# Patient Record
Sex: FEMALE | Race: BLACK OR AFRICAN AMERICAN | URBAN - METROPOLITAN AREA
[De-identification: names, ages, dates, MRNs, and addresses within clinical notes are randomized per-mention and may not be internally consistent; named-entity substitution may affect disease eponyms.]

---

## 2018-07-31 ENCOUNTER — HISTORICAL (OUTPATIENT)
Dept: ADMINISTRATIVE | Facility: HOSPITAL | Age: 38
End: 2018-07-31

## 2018-07-31 LAB
APPEARANCE, UA: ABNORMAL
BACTERIA #/AREA URNS AUTO: ABNORMAL /[HPF]
BILIRUB SERPL-MCNC: NORMAL MG/DL
BILIRUB UR QL STRIP: NEGATIVE
BLOOD URINE, POC: NORMAL
CLARITY, POC UA: NORMAL
COLOR UR: YELLOW
COLOR, POC UA: NORMAL
GLUCOSE (UA): NORMAL
GLUCOSE UR QL STRIP: NEGATIVE
HGB UR QL STRIP: 0.06 MG/DL
HIV 1+2 AB+HIV1 P24 AG SERPL QL IA: NONREACTIVE
HYALINE CASTS #/AREA URNS LPF: ABNORMAL /[LPF]
KETONES UR QL STRIP: 10 MG/DL
KETONES UR QL STRIP: NORMAL
LEUKOCYTE EST, POC UA: NORMAL
LEUKOCYTE ESTERASE UR QL STRIP: 75 LEU/UL
MUCOUS THREADS URNS QL MICRO: ABNORMAL
NITRITE UR QL STRIP: NEGATIVE
NITRITE, POC UA: NEGATIVE
PH UR STRIP: 6 [PH] (ref 4.5–8)
PH, POC UA: 6
PROT UR QL STRIP: 30 MG/DL
PROTEIN, POC: NORMAL
RBC #/AREA URNS AUTO: ABNORMAL /[HPF]
SP GR UR STRIP: 1.03 (ref 1–1.03)
SPECIFIC GRAVITY, POC UA: 1.02
SQUAMOUS #/AREA URNS LPF: >100 /[LPF]
UROBILINOGEN UR STRIP-ACNC: 2 MG/DL
UROBILINOGEN, POC UA: NORMAL
WBC #/AREA URNS AUTO: ABNORMAL /HPF

## 2018-08-02 LAB — FINAL CULTURE: NORMAL

## 2022-04-28 VITALS
BODY MASS INDEX: 21.79 KG/M2 | OXYGEN SATURATION: 100 % | WEIGHT: 155.63 LBS | HEIGHT: 71 IN | DIASTOLIC BLOOD PRESSURE: 96 MMHG | SYSTOLIC BLOOD PRESSURE: 142 MMHG

## 2022-05-03 NOTE — HISTORICAL [facility] CERNER
This is a historical note converted from [employer]. Formatting and pictures may have been removed.  Please reference [employer]ner for original formatting and attached multimedia. Chief Complaint  c/o nodule to forehead, mone ears, burning on urination, bump to vaginal area, brown vaginal discharge x 2 days  History of Present Illness  39 yo BF with recent diagnosis of Hep C (diagnosed through [facility]); awaiting appt with [facility] ID clinic; HIV was non reactive in June. Patient noticed swelling to scalp 2-3 days ago and awoke with swelling to forehead and swelling in front of both ears without redness; no ear pain. Patient noticed painful lesions to genital area since yesterday. Last sexual encounter was 2 days ago with long time partner who is also Hepatitis C +. Burning with urination x 2 days. Brown vaginal discharge with no reports of odor. No fever or chills; no N/V. No pelvic or back pain.  Review of Systems  GENERAL: Negative except as stated?in HPI  CV: Negative except as stated in HPI  RESP: Negative except as stated?in HPI  GI: Negative?except as stated in?HPI  : Negative?except as stated in?HPI  SKIN: Negative?except as stated in?HPI  Neuro: Negative?except as stated in?HPI  MS: Negative?except as stated in?HPI  Psych: Negative?except as stated in?HPI  Physical Exam  Vitals & Measurements  T:?36.6? ?C (Oral)? BP:?142/96? SpO2:?100%?  HT:?181?cm? HT:?181?cm? WT:?70.6?kg? WT:?70.6?kg? BMI:?21.55?  Vital Signs reviewed  General: Well nourished. No acute distress.  Eyes: Extraocular movements intact. Normal conjunctiva.  HENT: No cervical lymphadenopathy on palpation. No intraoral lesions or erythema. Normal posterior pharynx. Nares patent and without lesions. ?Preauricular?lymph node enlargement. Mild swelling to forehead with?possible insect bite noted to scalp; no induration or drainage noted.  CV: Regular rate and rhythm. No swelling.  Respiratory:?Lungs clear to auscultation bilaterally. Respirations  even and non-labored.  Abdominal: Abdomen soft and non-tender. Active bowel sounds x 4 quadrants. No splenohepatomegaly.  : 2 vesicular?lesions noted to periurethral area tenderness and erythema; on speculum exam, no vaginal masses or lesions noted; moderate amount of thin white?vaginal?discharge with odor. ?Cervix appeared within normal limits. ?On bimanual exam no cervical motion tenderness. No inguinal lymphadenopathy.  Musculoskeletal: No deformity or alteration in gait.  Neuro: Awake, alert and oriented. No focal deficits noted.  Psych: Normal mood and affect; cooperative.  ?  ?  Assessment/Plan  1.?BV (bacterial vaginosis)  ?Flagyl 500 mg by mouth twice a day ?7 days. ?Diflucan 150 mg by mouth ?1 dose at completion of Flagyl.  ?  2.?Acute UTI  Urinalysis with positive protein, leukoesterase, white blood cells, and red blood cells; nitrite negative. ?Urine C&S pending. ?Macrobid 100 mg by mouth twice a day ?7 days. ?Plenty of fluids. ?Follow-up for fever, chills,?nausea vomiting, or back pain.  ?  3.?Hepatitis C  Keeps scheduled appointment with infectious disease clinic for treatment of hepatitis C.  ?  4.?Female genital lesion  HSV culture collected tonight. ?HIV?collected; urine for GC and Chlamydia. ?Clinic will contact patient with these results.??Safe sex practices discussed.  Ordered:  HSV by DFA w/Reflex to HSV Culture-[facility], Stat collect, Micro Specimen, Collected, 07/31/18 20:18:00 CDT, Stop date 07/31/18 20:18:00 CDT, Nurse collect, Female genital lesion, 07/31/18 20:18:00 CDT  ?  Burning with urination  Ordered:  Urine Culture [id number], Stat collect, 07/31/18 19:43:00 CDT, Urine, Nurse collect, Stop date 07/31/18 19:44:00 CDT, Burning with urination  ?  Sexually transmitted disease (STD)  Ordered:  Chlamydia trach and N. gonorrhea PCR, Routine collect, Urine, Collected, 07/31/18 20:12:00 CDT, Stop date 07/31/18 20:12:00 CDT, Nurse collect, Sexually transmitted disease (STD)  HIV 1 and 2, Stat collect,  07/31/18 20:12:00 CDT, Blood, Collected, Stop date 07/31/18 20:12:00 CDT, Nurse collect, Sexually transmitted disease (STD), 07/31/18 20:12:00 CDT  ?  Vaginal discharge  ?   Problem List/Past Medical History  Ongoing  Chronic neck pain  Hepatitis C  Historical  No qualifying data  Procedure/Surgical History  skull fracture   Medications  ALLERGY 10 MG TABLET, 10 mg= 1 tab(s), Oral, Daily  Diflucan 150 mg oral tablet, See Instructions  Flagyl 500 mg oral tablet, 500 mg= 1 tab(s), Oral, q12hr  Macrobid 100 mg oral capsule, 100 mg= 1 cap(s), Oral, BID  XULANE PATCH  Allergies  No Known Allergies  Social History  Alcohol - Denies Alcohol Use, 12/22/2012  Current, 1-2 times per month, 07/31/2018  Substance Abuse - Denies Substance Abuse, 07/28/2015  Never, 07/31/2018  Tobacco - High Risk, 07/28/2015  Current every day smoker Use:. Cigarettes Type:. 7 per day., 07/31/2018  Current every day smoker, Cigarettes, 08/01/2015  Immunizations  Vaccine Date Status   tetanus/diphtheria/pertussis, acel(Tdap) 08/01/2015 Given   Health Maintenance  Health Maintenance  ???Pending?(in the next year)  ??? ??Due?  ??? ? ? ?Alcohol Misuse Screening due??07/31/18??and every 1??year(s)  ???Satisfied?(in the past 1 year)  ??? ??Satisfied?  ??? ? ? ?Blood Pressure Screening on??07/31/18.??Satisfied by Whitney Walsh LPN  ??? ? ? ?Body Mass Index Check on??07/31/18.??Satisfied by Whitney Walsh LPN  ??? ? ? ?Depression Screening on??07/31/18.??Satisfied by Whitney Walsh LPN  ??? ? ? ?Obesity Screening on??07/31/18.??Satisfied by Whitney Walsh LPN  ??? ? ? ?Smoking Cessation on??07/31/18.??Satisfied by Whitney Walsh LPN  ?  ?      Patient presented to [facility] today 08/01/18 requesting to start herpes treatment pending official culture results. Patient states she is moving to [place] today and is concerned because the lesions are becoming bigger and more painful. She intends to keep upcoming appt with ID clinic despite moving to [place]. Rx  for Valcyclovir 1 gram PO BID x 7 days (escribed to patients pharmay). I informed her GC and chlamydia testing was negative. [facility] will contact patient with results of HIV and Herpes culture when they become available.  Albina WATTS, EZIOP-C

## 2024-02-09 ENCOUNTER — OFFICE VISIT (OUTPATIENT)
Age: 44
End: 2024-02-09

## 2024-02-09 VITALS
SYSTOLIC BLOOD PRESSURE: 117 MMHG | HEIGHT: 69 IN | HEART RATE: 86 BPM | RESPIRATION RATE: 16 BRPM | WEIGHT: 293 LBS | BODY MASS INDEX: 43.4 KG/M2 | TEMPERATURE: 96.8 F | OXYGEN SATURATION: 100 % | DIASTOLIC BLOOD PRESSURE: 80 MMHG

## 2024-02-09 DIAGNOSIS — F17.200 SMOKER: ICD-10-CM

## 2024-02-09 DIAGNOSIS — I82.4Y9 DEEP VEIN THROMBOSIS (DVT) OF PROXIMAL LOWER EXTREMITY, UNSPECIFIED CHRONICITY, UNSPECIFIED LATERALITY (HCC): ICD-10-CM

## 2024-02-09 DIAGNOSIS — Z79.01 ANTICOAGULATED: ICD-10-CM

## 2024-02-09 DIAGNOSIS — R79.89 LOW TSH LEVEL: ICD-10-CM

## 2024-02-09 DIAGNOSIS — E66.01 MORBID OBESITY (HCC): ICD-10-CM

## 2024-02-09 DIAGNOSIS — K30 FUNCTIONAL DYSPEPSIA: ICD-10-CM

## 2024-02-09 DIAGNOSIS — E66.01 MORBID OBESITY WITH BODY MASS INDEX (BMI) OF 40.0 TO 49.9 (HCC): Primary | ICD-10-CM

## 2024-02-09 PROBLEM — I82.409 DVT (DEEP VENOUS THROMBOSIS) (HCC): Status: ACTIVE | Noted: 2024-02-09

## 2024-02-09 NOTE — PROGRESS NOTES
Bariatric Surgery Consultation    Subjective:     The patient is a 43 y.o. obese female with a Body mass index is 43.5 kg/m²..  The patient is currently her heaviest weight for the past several years.  she has been overweight since early adulthood.   she has been considering surgery since last year.  she desires surgery at this time because of multiple health concerns and their lifestyle issues which are hindered by their weight. she has been referred by his family physician for evaluation and treatment of their obesity via surgical intervention. Norris Yañez has tried multiple diets in her lifetime most recently tried behavior modification and unsupervised diets    Bariatric comorbidities present are   Patient Active Problem List   Diagnosis    Morbid obesity (HCC)    Morbid obesity with body mass index (BMI) of 40.0 to 49.9 (HCC)    Anticoagulated    DVT (deep venous thrombosis) (HCC)    Functional dyspepsia    Low TSH level    Smoker     The patient is considering laparoscopic sleeve gastrectomy  for surgical weight loss due to their ineffective progress with medical forms of weight loss and the urging of their physician who cares for their primary medical issues. The patient  now presents  for consideration for weight loss surgery understanding the benefits of this over a medical approach of weight loss as was discussed in our presentation on weight loss surgery. They have discussed their plans both with their family and primary care physician who is in support of their pursuit of such. The patient has had no health issues as of late and denies and gastrointestinal disturbances other than what is outlined below in their review of symptoms. All of their prior evaluations available by both their PCP's and specialists physicians have been reviewed today either in the Care Everywhere portal or scanned under the media tab.    I have spent a large portion of my initial consultation today reviewing the patients

## 2024-03-14 ENCOUNTER — HOSPITAL ENCOUNTER (OUTPATIENT)
Facility: HOSPITAL | Age: 44
Discharge: HOME | End: 2024-03-17

## 2024-03-14 VITALS — WEIGHT: 287.3 LBS | BODY MASS INDEX: 42.55 KG/M2 | HEIGHT: 69 IN

## 2024-03-14 NOTE — PROGRESS NOTES
vigorous physical activity.    We talked about recommended types of physical activity, including walking, swimming, cycling, or chair exercises.  I also talked with patient about doing some strength training, which helps the metabolism, as well as strengthen muscles and bones.  Patient's plan to incorporate more activity includes: \"more exercising, more discipline\".      Behavior Modification     Comments:  During today's class, we discussed mindful eating as a behavior change tool to improve eating behaviors, promote weight loss and long-term weight maintenance, encourage a healthy relationship with food, and prevent chronic disease.  Patient was directed to the handouts on mindful eating that were provided in class.  We discussed the acronym HALT, which is a reminder to promote eating only when physically hungry and not as a way of dealing with difficult emotions, fatigue or other life stressors.      Goals:   Work to increase to 3-4 small meals per day, with 1-2 planned snacks as needed.  Recommend following the plate method for meal planning - focusing on lean protein, non-starchy vegetables, and measured amounts of complex carbohydrates.   - Goal of  g protein and <100 g carbohydrates per day.               - Select at least 2 DIFFERENT protein supplements that can be used for protein supplementation to meet goals pre- and post-operatively.   - Practice Carbohydrate Counting and label reading.   - Follow 3 g rule for fat and sugar.  2. Slow down meals  - Chew each bite 25-35 times.  - Aim for 20-30 min/meal.  3. Increase non-caloric fluids to 64 oz per day.  Eliminate caffeine, added sugar, carbonation, and straws.               - Continue to work to decrease sugar sweetened beverages - goal of calorie-free beverages only.              - Must eliminate caffeine prior to surgery and avoid for ~6-8 weeks.   - Practice 30:30 rule,  food and fluid intake.  4. Start activity regimen, work to increase

## 2024-04-04 ENCOUNTER — HOSPITAL ENCOUNTER (OUTPATIENT)
Facility: HOSPITAL | Age: 44
Discharge: HOME | End: 2024-04-07

## 2024-04-04 VITALS — HEIGHT: 69 IN | BODY MASS INDEX: 42.05 KG/M2 | WEIGHT: 283.9 LBS

## 2024-04-04 DIAGNOSIS — E66.01 MORBID OBESITY WITH BODY MASS INDEX (BMI) OF 40.0 TO 49.9 (HCC): ICD-10-CM

## 2024-04-04 DIAGNOSIS — E66.01 MORBID OBESITY (HCC): Primary | ICD-10-CM

## 2024-04-04 DIAGNOSIS — Z01.812 PRE-OPERATIVE LABORATORY EXAMINATION: ICD-10-CM

## 2024-04-04 DIAGNOSIS — E66.01 MORBID OBESITY (HCC): ICD-10-CM

## 2024-04-04 NOTE — PROGRESS NOTES
Medical Weight Loss Multi-Disciplinary Program    Patient's Name: Norris Yañez Age: 43 y.o.   YOB: 1980 Sex: female     Session #2. Pt attended in-person class.  Weight obtained in office.    Date: 4/4/2024    Vitals:    04/04/24 1530   Weight: 128.8 kg (283 lb 14.4 oz)   Height: 1.753 m (5' 9\")      Body mass index is 41.92 kg/m².              Pounds Lost since last month: 3.4 lbs  Pounds Gained since last month: 0 lbs       Starting Weight: 294.6 lbs Previous Month’s Weight: 287.3 lbs   Overall Pounds Lost: 3.6 lbs Overall Pounds Gained: 0 lbs             Class Guidelines    Guidelines are reviewed with patient at the start of every class.    1. Patient understands that weight loss trial classes must be consecutive.  Patient understands if they miss a class, it is their responsibility to contact me to reschedule class.  I will reach out to patient after their first no show.  2.  Patient understands the expectations that weight maintenance/weight loss is expected during the classes.  Failure to demonstrate changes may result in extension of weight loss trial, followed by returning to see the surgeon.  Patient understands that they CANNOT gain any weight during the weight loss trial.  Gaining weight will result in extension of weight loss trial.  3. Patient is also instructed to complete their labwork, psychological evaluation visit, and any other tests that the surgeon has used while they are working on their weight loss trial.  4.  Patient was instructed to bring their packet of nutrition education materials to every class and appointment.        Eating Habits and Behaviors    Today in class we reviewed the Key Diet Principles.  Patient was encouraged to consume 3 meals each day, and the timing the meals was reviewed.  Meal time behaviors that will help pt to be successful with their weight loss efforts were additionally reviewed.      We discussed the importance of drinking adequate amounts

## 2024-04-23 ENCOUNTER — TELEPHONE (OUTPATIENT)
Age: 44
End: 2024-04-23

## 2024-04-23 NOTE — TELEPHONE ENCOUNTER
Patient was contacted to discuss some important pre-op information and education in preparation for surgery.

## 2024-05-02 ENCOUNTER — TELEPHONE (OUTPATIENT)
Age: 44
End: 2024-05-02

## 2024-05-02 NOTE — TELEPHONE ENCOUNTER
Patient was contacted to discuss some important pre-op information and education in preparation for surgery.       Diabetes:  Have you been testing your blood sugar levels at home, and what have they been the past few days?  Not diabetic     If they are not significantly lower than 200 the entire month prior to surgery, you need to let us know and contact your family physician for an appointment.    Hypertension:  Are you monitoring your blood pressure at home, and what has it been? No hypertension     If your blood pressure has not been well-controlled with a systolic pressure lower than  160 and a diastolic pressure lower than 100, you need to contact your family physician for an appointment.    Steroids (oral and/or injectable):  Have you recently taken any oral steroids such as Prednisone or a Medrol dose pack for a respiratory infection or COVID? No     Have you had any steroid injections such as a cortisone injection in your back, knee, foot, or any other part of your body? No     You can not have any oral steroids and/or steroid injections 45 days prior to surgery.      NSAIDS:  Have you recently taken any NSAIDS such as Mobic, Aleve, Naprosyn, Motrin, BC or Goody's powder?  No and Pt is aware     NSAIDS must be stopped 14 days prior to surgery.  If you are currently taking aspirin or Plavix that a physician has prescribed, continue to take it as prescribed, and a provider in our office will advise you during your pre-op appointment.    Birth Control:  Are you currently taking any form of birth control?  If so, are you taking the Depo Provera injection or oral birth control pills?  No     You need to stop taking it two weeks prior to surgery and can start back two weeks after surgery.  You will need to use another form of protection during this time, such as condoms to avoid pregancy.  If you have an IUD or implant as a form of birth control, it is okay, and you may continue.    Medication:  Have you had any

## 2024-05-03 ENCOUNTER — HOSPITAL ENCOUNTER (OUTPATIENT)
Facility: HOSPITAL | Age: 44
Discharge: HOME | End: 2024-05-06

## 2024-05-03 ENCOUNTER — HOSPITAL ENCOUNTER (OUTPATIENT)
Facility: HOSPITAL | Age: 44
End: 2024-05-03
Payer: MEDICAID

## 2024-05-03 DIAGNOSIS — E66.01 MORBID OBESITY WITH BODY MASS INDEX (BMI) OF 40.0 TO 49.9 (HCC): ICD-10-CM

## 2024-05-03 DIAGNOSIS — Z01.812 PRE-OPERATIVE LABORATORY EXAMINATION: ICD-10-CM

## 2024-05-03 DIAGNOSIS — E66.01 MORBID OBESITY (HCC): ICD-10-CM

## 2024-05-03 LAB
EKG ATRIAL RATE: 57 BPM
EKG DIAGNOSIS: NORMAL
EKG P AXIS: 50 DEGREES
EKG P-R INTERVAL: 154 MS
EKG Q-T INTERVAL: 414 MS
EKG QRS DURATION: 88 MS
EKG QTC CALCULATION (BAZETT): 402 MS
EKG R AXIS: 51 DEGREES
EKG T AXIS: 19 DEGREES
EKG VENTRICULAR RATE: 57 BPM
LABCORP SPECIMEN COLLECTION: NORMAL

## 2024-05-03 PROCEDURE — 93005 ELECTROCARDIOGRAM TRACING: CPT

## 2024-05-03 PROCEDURE — 99001 SPECIMEN HANDLING PT-LAB: CPT

## 2024-05-04 LAB
ALBUMIN SERPL-MCNC: 3.7 G/DL (ref 3.9–4.9)
ALBUMIN/GLOB SERPL: 1.2 {RATIO} (ref 1.2–2.2)
ALP SERPL-CCNC: 92 IU/L (ref 44–121)
ALT SERPL-CCNC: 11 IU/L (ref 0–32)
AST SERPL-CCNC: 13 IU/L (ref 0–40)
BASOPHILS # BLD AUTO: 0 X10E3/UL (ref 0–0.2)
BASOPHILS NFR BLD AUTO: 0 %
BILIRUB SERPL-MCNC: 0.5 MG/DL (ref 0–1.2)
BUN SERPL-MCNC: 7 MG/DL (ref 6–24)
BUN/CREAT SERPL: 10 (ref 9–23)
CALCIUM SERPL-MCNC: 8.7 MG/DL (ref 8.7–10.2)
CHLORIDE SERPL-SCNC: 106 MMOL/L (ref 96–106)
CO2 SERPL-SCNC: 22 MMOL/L (ref 20–29)
CREAT SERPL-MCNC: 0.7 MG/DL (ref 0.57–1)
EGFRCR SERPLBLD CKD-EPI 2021: 111 ML/MIN/1.73
EOSINOPHIL # BLD AUTO: 0 X10E3/UL (ref 0–0.4)
EOSINOPHIL NFR BLD AUTO: 1 %
ERYTHROCYTE [DISTWIDTH] IN BLOOD BY AUTOMATED COUNT: 13 % (ref 11.7–15.4)
GLOBULIN SER CALC-MCNC: 3.2 G/DL (ref 1.5–4.5)
GLUCOSE SERPL-MCNC: 88 MG/DL (ref 70–99)
HCG INTACT+B SERPL-ACNC: <1 MIU/ML
HCT VFR BLD AUTO: 36.8 % (ref 34–46.6)
HGB BLD-MCNC: 12 G/DL (ref 11.1–15.9)
IMM GRANULOCYTES # BLD AUTO: 0 X10E3/UL (ref 0–0.1)
IMM GRANULOCYTES NFR BLD AUTO: 0 %
LYMPHOCYTES # BLD AUTO: 1.8 X10E3/UL (ref 0.7–3.1)
LYMPHOCYTES NFR BLD AUTO: 38 %
MCH RBC QN AUTO: 31.3 PG (ref 26.6–33)
MCHC RBC AUTO-ENTMCNC: 32.6 G/DL (ref 31.5–35.7)
MCV RBC AUTO: 96 FL (ref 79–97)
MONOCYTES # BLD AUTO: 0.4 X10E3/UL (ref 0.1–0.9)
MONOCYTES NFR BLD AUTO: 8 %
NEUTROPHILS # BLD AUTO: 2.5 X10E3/UL (ref 1.4–7)
NEUTROPHILS NFR BLD AUTO: 53 %
PLATELET # BLD AUTO: 254 X10E3/UL (ref 150–450)
POTASSIUM SERPL-SCNC: 3.7 MMOL/L (ref 3.5–5.2)
PROT SERPL-MCNC: 6.9 G/DL (ref 6–8.5)
RBC # BLD AUTO: 3.84 X10E6/UL (ref 3.77–5.28)
SODIUM SERPL-SCNC: 142 MMOL/L (ref 134–144)
SPECIMEN STATUS REPORT: NORMAL
WBC # BLD AUTO: 4.7 X10E3/UL (ref 3.4–10.8)

## 2024-05-08 ENCOUNTER — ANESTHESIA EVENT (OUTPATIENT)
Facility: HOSPITAL | Age: 44
End: 2024-05-08
Payer: MEDICAID

## 2024-05-13 ENCOUNTER — TRANSCRIBE ORDERS (OUTPATIENT)
Facility: HOSPITAL | Age: 44
End: 2024-05-13

## 2024-05-13 ENCOUNTER — OFFICE VISIT (OUTPATIENT)
Age: 44
End: 2024-05-13
Payer: MEDICAID

## 2024-05-13 ENCOUNTER — CLINICAL DOCUMENTATION (OUTPATIENT)
Age: 44
End: 2024-05-13

## 2024-05-13 ENCOUNTER — HOSPITAL ENCOUNTER (OUTPATIENT)
Facility: HOSPITAL | Age: 44
Discharge: HOME | End: 2024-05-16
Payer: MEDICAID

## 2024-05-13 ENCOUNTER — HOSPITAL ENCOUNTER (OUTPATIENT)
Facility: HOSPITAL | Age: 44
Discharge: HOME | End: 2024-05-16

## 2024-05-13 VITALS
DIASTOLIC BLOOD PRESSURE: 66 MMHG | SYSTOLIC BLOOD PRESSURE: 127 MMHG | HEIGHT: 69 IN | RESPIRATION RATE: 18 BRPM | TEMPERATURE: 96.8 F | BODY MASS INDEX: 42.42 KG/M2 | OXYGEN SATURATION: 100 % | WEIGHT: 286.4 LBS | HEART RATE: 62 BPM

## 2024-05-13 DIAGNOSIS — E66.01 MORBID OBESITY (HCC): ICD-10-CM

## 2024-05-13 DIAGNOSIS — R79.89 LOW TSH LEVEL: ICD-10-CM

## 2024-05-13 DIAGNOSIS — I82.4Y9 DEEP VEIN THROMBOSIS (DVT) OF PROXIMAL LOWER EXTREMITY, UNSPECIFIED CHRONICITY, UNSPECIFIED LATERALITY (HCC): ICD-10-CM

## 2024-05-13 DIAGNOSIS — E66.01 MORBID OBESITY WITH BODY MASS INDEX (BMI) OF 40.0 TO 49.9 (HCC): ICD-10-CM

## 2024-05-13 DIAGNOSIS — E66.01 MORBID OBESITY (HCC): Primary | ICD-10-CM

## 2024-05-13 DIAGNOSIS — Z79.01 ANTICOAGULATED: ICD-10-CM

## 2024-05-13 PROBLEM — F17.200 SMOKER: Status: RESOLVED | Noted: 2024-02-09 | Resolved: 2024-05-13

## 2024-05-13 LAB
ABO + RH BLD: NORMAL
BLOOD GROUP ANTIBODIES SERPL: NORMAL
SPECIMEN EXP DATE BLD: NORMAL

## 2024-05-13 PROCEDURE — 36415 COLL VENOUS BLD VENIPUNCTURE: CPT

## 2024-05-13 PROCEDURE — 86900 BLOOD TYPING SEROLOGIC ABO: CPT

## 2024-05-13 PROCEDURE — 99215 OFFICE O/P EST HI 40 MIN: CPT | Performed by: SPECIALIST

## 2024-05-13 PROCEDURE — 86850 RBC ANTIBODY SCREEN: CPT

## 2024-05-13 PROCEDURE — 86901 BLOOD TYPING SEROLOGIC RH(D): CPT

## 2024-05-13 RX ORDER — ONDANSETRON 8 MG/1
8 TABLET, ORALLY DISINTEGRATING ORAL EVERY 8 HOURS PRN
Qty: 30 TABLET | Refills: 0 | Status: SHIPPED | OUTPATIENT
Start: 2024-05-13

## 2024-05-13 NOTE — PROGRESS NOTES
CLINICAL NUTRITION PRE-OPERATIVE EDUCATION    Patient's Name: Norris Yañez   Age: 43 y.o.  YOB: 1980   Sex: female    Education & Materials Provided: Post-op Binder to include:  Liquid Diet Shopping List   Supplemental Resource Guide: MVI, B-12, Calcium Citrate, Vitamin D, Vitamin B-50, and iron recommendations  Protein Supplement Information   Fluid Requirements/ No Straws  No Caffeine or Carbonation   No alcohol              No Snacks or No Concentrated Sweets     Exercising   Key Diet Principles            Addressed Current Habits/Changes to Make   Patient was instructed on clear liquid diet to follow for one (1) day prior to surgery.  Patient has been educated on the liquid diet to begin day 2 post-op and continue for 2 weeks post surgery.   Patient understands the transition of the diet and need to remain on full liquid diet until advanced by provider and dietitian.       Summary:  Patient has completed the required amount of visits with the Registered Dietitian.   During these nutrition visits, we focused on dietary changes, behavior changes, and the importance of establishing an exercise routine.  The diet protocol that patient was prescribed emphasized on low carbohydrates (less than 50 grams per day prior to surgery), and 60-80 grams of protein per day.    At today's session, patient was educated on the post-op diet and vitamin/mineral protocols.  Patient understands the importance of keeping total fat and sugar intake to less than 3 g per serving.  Patient is aware of the timeline for the different stages of the post-op diet and is aware that they will be on a modified full liquid diet for 2 weeks post-op.  Patient understands that the body needs ~ g/d protein after surgery.  During the liquid diet phase, patient will need a minimum of 60 g/d protein from supplemental shakes.  Once eating soft protein-rich foods, patient will need 40-60 g/d protein from supplemental shakes to

## 2024-05-13 NOTE — H&P (VIEW-ONLY)
behavior, speech, dress, motor activity, and thought processes  Eyes - pupils equal and reactive, extraocular eye movements intact, sclera anicteric, left eye normal, right eye normal  Ears - right ear normal, left ear normal  Neck - supple, no significant adenopathy  Chest - clear to auscultation, no wheezes, rales or rhonchi, symmetric air entry  Heart - normal rate and regular rhythm  Abdomen - soft, nontender, nondistended, no masses or organomegaly  Back exam - full range of motion, no tenderness, palpable spasm or pain on motion  Neurological - alert, oriented, normal speech, no focal findings or movement disorder noted  Musculoskeletal - no joint tenderness, deformity or swelling  Extremities - peripheral pulses normal, no pedal edema, no clubbing or cyanosis    Labs / Preoperative Evaluation:        Recent Results (from the past 1008 hour(s))   CBC/DIFF AMBIGUOUS DEFAULT    Collection Time: 05/03/24 12:00 AM   Result Value Ref Range    WBC 4.7 3.4 - 10.8 x10E3/uL    RBC 3.84 3.77 - 5.28 x10E6/uL    Hemoglobin 12.0 11.1 - 15.9 g/dL    Hematocrit 36.8 34.0 - 46.6 %    MCV 96 79 - 97 fL    MCH 31.3 26.6 - 33.0 pg    MCHC 32.6 31.5 - 35.7 g/dL    RDW 13.0 11.7 - 15.4 %    Platelets 254 150 - 450 x10E3/uL    Neutrophils % 53 Not Estab. %    Lymphocytes % 38 Not Estab. %    Monocytes % 8 Not Estab. %    Eosinophils % 1 Not Estab. %    Basophils % 0 Not Estab. %    Neutrophils Absolute 2.5 1.4 - 7.0 x10E3/uL    Lymphocytes Absolute 1.8 0.7 - 3.1 x10E3/uL    Monocytes Absolute 0.4 0.1 - 0.9 x10E3/uL    Eosinophils Absolute 0.0 0.0 - 0.4 x10E3/uL    Basophils Absolute 0.0 0.0 - 0.2 x10E3/uL    Immature Granulocytes % 0 Not Estab. %    Immature Grans (Abs) 0.0 0.0 - 0.1 x10E3/uL   Comprehensive Metabolic Panel    Collection Time: 05/03/24 12:00 AM   Result Value Ref Range    Glucose 88 70 - 99 mg/dL    BUN 7 6 - 24 mg/dL    Creatinine 0.70 0.57 - 1.00 mg/dL    Est, Glom Filt Rate 111 >59 mL/min/1.73    BUN/Creatinine  intervention of choice for treatment of morbid obestiy through surgical means secondary to its safety profile, rapid return to work  and decreases in operative risks over gastric bypass.    In the office today, following Dianamarquez's history and physical examination, a 40 minute discussion regarding the anatomic alterations for the laparoscopic sleeve gastrectomy was undertaken. The dietary expectations and the patient  dependent factors for success were thoroughly discussed, to include the need for interval follow-up and long-term dietary changes associated with success. The possible complications of the sleeve gastrectomy  were also discussed, to include;death, DVT/PE, staple line leak, bleeding, stricture formation, infection, nutritional deficiencies and sleeve dilation.  Specific weight related outcomes for success were also discussed with an emphasis on careful and close follow-up with the first year and eating behavior modification as the baseline and cyclical hunger return.  The patient expressed an understanding of the above factors, and her questions were answered in their entirety.    In addition, the patient attended a 1.5 hour power point seminar regarding obesity, surgical weight loss including, adjustable gastric band, gastric bypass, and sleeve gastrectomy.  This discussion contrasted the different surgical techniques, mechanisms of actions and expected outcomes, and surgical and medical risks associated with each procedure.  During this seminar, there was a long question and answer session where each questions was answered until there were no additional questions.     Today, the patient had all of her questions answered and the decision was made today that the patient's preoperative evaluation is acceptable for them  to proceed with bariatric surgery  choosing the sleeve gastrectomy as her surgical option.    The patient understands the plan of action        Secondary Diagnoses:     DVT / Pulmonary

## 2024-05-13 NOTE — PROGRESS NOTES
Patient attended bariatric surgery pre-operative education class today. Patient appeared to have an understanding of the surgical procedure, pre-op and post-op risks, and lifestyle changes.  Patient asked appropriate questions, and all questions were answered in their entirety.  Patient was given a bariatric binder of resources; form acknowledging receipt of said book was completed.  Zofran was sent to patient's pharmacy on file.

## 2024-05-23 ENCOUNTER — ANESTHESIA (OUTPATIENT)
Facility: HOSPITAL | Age: 44
End: 2024-05-23
Payer: MEDICAID

## 2024-05-23 ENCOUNTER — HOSPITAL ENCOUNTER (INPATIENT)
Facility: HOSPITAL | Age: 44
LOS: 2 days | Discharge: HOME | End: 2024-05-25
Attending: SPECIALIST | Admitting: SPECIALIST
Payer: MEDICAID

## 2024-05-23 DIAGNOSIS — G89.18 POST-OP PAIN: Primary | ICD-10-CM

## 2024-05-23 LAB — HCG UR QL: NEGATIVE

## 2024-05-23 PROCEDURE — 47379 UNLISTED LAPS PX LIVER: CPT | Performed by: SPECIALIST

## 2024-05-23 PROCEDURE — 88305 TISSUE EXAM BY PATHOLOGIST: CPT

## 2024-05-23 PROCEDURE — 88313 SPECIAL STAINS GROUP 2: CPT

## 2024-05-23 PROCEDURE — 6360000002 HC RX W HCPCS: Performed by: SPECIALIST

## 2024-05-23 PROCEDURE — 0DB64Z3 EXCISION OF STOMACH, PERCUTANEOUS ENDOSCOPIC APPROACH, VERTICAL: ICD-10-PCS | Performed by: SPECIALIST

## 2024-05-23 PROCEDURE — 0FB04ZX EXCISION OF LIVER, PERCUTANEOUS ENDOSCOPIC APPROACH, DIAGNOSTIC: ICD-10-PCS | Performed by: SPECIALIST

## 2024-05-23 PROCEDURE — 6370000000 HC RX 637 (ALT 250 FOR IP): Performed by: SPECIALIST

## 2024-05-23 PROCEDURE — A4216 STERILE WATER/SALINE, 10 ML: HCPCS | Performed by: SPECIALIST

## 2024-05-23 PROCEDURE — C1781 MESH (IMPLANTABLE): HCPCS | Performed by: SPECIALIST

## 2024-05-23 PROCEDURE — 1100000000 HC RM PRIVATE

## 2024-05-23 PROCEDURE — 3600000015 HC SURGERY LEVEL 5 ADDTL 15MIN: Performed by: SPECIALIST

## 2024-05-23 PROCEDURE — 2580000003 HC RX 258: Performed by: SPECIALIST

## 2024-05-23 PROCEDURE — 7100000000 HC PACU RECOVERY - FIRST 15 MIN: Performed by: SPECIALIST

## 2024-05-23 PROCEDURE — 43775 LAP SLEEVE GASTRECTOMY: CPT | Performed by: SPECIALIST

## 2024-05-23 PROCEDURE — 3700000001 HC ADD 15 MINUTES (ANESTHESIA): Performed by: SPECIALIST

## 2024-05-23 PROCEDURE — 2500000003 HC RX 250 WO HCPCS

## 2024-05-23 PROCEDURE — C9113 INJ PANTOPRAZOLE SODIUM, VIA: HCPCS | Performed by: SPECIALIST

## 2024-05-23 PROCEDURE — 0DB68ZX EXCISION OF STOMACH, VIA NATURAL OR ARTIFICIAL OPENING ENDOSCOPIC, DIAGNOSTIC: ICD-10-PCS | Performed by: SPECIALIST

## 2024-05-23 PROCEDURE — 47100 WEDGE BIOPSY OF LIVER: CPT | Performed by: SPECIALIST

## 2024-05-23 PROCEDURE — 3700000000 HC ANESTHESIA ATTENDED CARE: Performed by: SPECIALIST

## 2024-05-23 PROCEDURE — 43239 EGD BIOPSY SINGLE/MULTIPLE: CPT | Performed by: SPECIALIST

## 2024-05-23 PROCEDURE — 88307 TISSUE EXAM BY PATHOLOGIST: CPT

## 2024-05-23 PROCEDURE — 6360000002 HC RX W HCPCS

## 2024-05-23 PROCEDURE — 81025 URINE PREGNANCY TEST: CPT

## 2024-05-23 PROCEDURE — C1713 ANCHOR/SCREW BN/BN,TIS/BN: HCPCS | Performed by: SPECIALIST

## 2024-05-23 PROCEDURE — 2709999900 HC NON-CHARGEABLE SUPPLY: Performed by: SPECIALIST

## 2024-05-23 PROCEDURE — 2720000010 HC SURG SUPPLY STERILE: Performed by: SPECIALIST

## 2024-05-23 PROCEDURE — 3600000005 HC SURGERY LEVEL 5 BASE: Performed by: SPECIALIST

## 2024-05-23 PROCEDURE — 2500000003 HC RX 250 WO HCPCS: Performed by: SPECIALIST

## 2024-05-23 PROCEDURE — 7100000001 HC PACU RECOVERY - ADDTL 15 MIN: Performed by: SPECIALIST

## 2024-05-23 RX ORDER — SODIUM CHLORIDE 0.9 % (FLUSH) 0.9 %
5-40 SYRINGE (ML) INJECTION PRN
Status: DISCONTINUED | OUTPATIENT
Start: 2024-05-23 | End: 2024-05-23 | Stop reason: HOSPADM

## 2024-05-23 RX ORDER — ENOXAPARIN SODIUM 100 MG/ML
40 INJECTION SUBCUTANEOUS ONCE
Status: COMPLETED | OUTPATIENT
Start: 2024-05-23 | End: 2024-05-23

## 2024-05-23 RX ORDER — SODIUM CHLORIDE 9 MG/ML
INJECTION, SOLUTION INTRAVENOUS PRN
Status: DISCONTINUED | OUTPATIENT
Start: 2024-05-23 | End: 2024-05-23 | Stop reason: HOSPADM

## 2024-05-23 RX ORDER — KETOROLAC TROMETHAMINE 30 MG/ML
INJECTION, SOLUTION INTRAMUSCULAR; INTRAVENOUS PRN
Status: DISCONTINUED | OUTPATIENT
Start: 2024-05-23 | End: 2024-05-23 | Stop reason: SDUPTHER

## 2024-05-23 RX ORDER — ACETAMINOPHEN 500 MG
1000 TABLET ORAL ONCE
Status: COMPLETED | OUTPATIENT
Start: 2024-05-23 | End: 2024-05-23

## 2024-05-23 RX ORDER — FENTANYL CITRATE 50 UG/ML
50 INJECTION, SOLUTION INTRAMUSCULAR; INTRAVENOUS EVERY 5 MIN PRN
Status: DISCONTINUED | OUTPATIENT
Start: 2024-05-23 | End: 2024-05-23 | Stop reason: HOSPADM

## 2024-05-23 RX ORDER — SODIUM CHLORIDE 0.9 % (FLUSH) 0.9 %
5-40 SYRINGE (ML) INJECTION EVERY 12 HOURS SCHEDULED
Status: DISCONTINUED | OUTPATIENT
Start: 2024-05-23 | End: 2024-05-25 | Stop reason: HOSPADM

## 2024-05-23 RX ORDER — LIDOCAINE HYDROCHLORIDE 20 MG/ML
INJECTION, SOLUTION INTRAVENOUS PRN
Status: DISCONTINUED | OUTPATIENT
Start: 2024-05-23 | End: 2024-05-23 | Stop reason: SDUPTHER

## 2024-05-23 RX ORDER — MORPHINE SULFATE 10 MG/ML
6 INJECTION, SOLUTION INTRAMUSCULAR; INTRAVENOUS
Status: DISCONTINUED | OUTPATIENT
Start: 2024-05-23 | End: 2024-05-24

## 2024-05-23 RX ORDER — OXYCODONE HYDROCHLORIDE 5 MG/1
5 TABLET ORAL PRN
Status: DISCONTINUED | OUTPATIENT
Start: 2024-05-23 | End: 2024-05-23 | Stop reason: HOSPADM

## 2024-05-23 RX ORDER — ONDANSETRON 2 MG/ML
INJECTION INTRAMUSCULAR; INTRAVENOUS PRN
Status: DISCONTINUED | OUTPATIENT
Start: 2024-05-23 | End: 2024-05-23 | Stop reason: SDUPTHER

## 2024-05-23 RX ORDER — SODIUM CHLORIDE, SODIUM LACTATE, POTASSIUM CHLORIDE, CALCIUM CHLORIDE 600; 310; 30; 20 MG/100ML; MG/100ML; MG/100ML; MG/100ML
INJECTION, SOLUTION INTRAVENOUS CONTINUOUS
Status: DISCONTINUED | OUTPATIENT
Start: 2024-05-23 | End: 2024-05-25 | Stop reason: HOSPADM

## 2024-05-23 RX ORDER — HYDROMORPHONE HYDROCHLORIDE 1 MG/ML
0.25 INJECTION, SOLUTION INTRAMUSCULAR; INTRAVENOUS; SUBCUTANEOUS EVERY 5 MIN PRN
Status: DISCONTINUED | OUTPATIENT
Start: 2024-05-23 | End: 2024-05-23 | Stop reason: HOSPADM

## 2024-05-23 RX ORDER — SODIUM CHLORIDE 9 MG/ML
INJECTION, SOLUTION INTRAVENOUS CONTINUOUS
Status: DISCONTINUED | OUTPATIENT
Start: 2024-05-23 | End: 2024-05-23 | Stop reason: HOSPADM

## 2024-05-23 RX ORDER — ONDANSETRON 4 MG/1
4 TABLET, ORALLY DISINTEGRATING ORAL EVERY 8 HOURS PRN
Status: DISCONTINUED | OUTPATIENT
Start: 2024-05-23 | End: 2024-05-25 | Stop reason: HOSPADM

## 2024-05-23 RX ORDER — NALOXONE HYDROCHLORIDE 0.4 MG/ML
INJECTION, SOLUTION INTRAMUSCULAR; INTRAVENOUS; SUBCUTANEOUS PRN
Status: DISCONTINUED | OUTPATIENT
Start: 2024-05-23 | End: 2024-05-23 | Stop reason: HOSPADM

## 2024-05-23 RX ORDER — FENTANYL CITRATE 50 UG/ML
INJECTION, SOLUTION INTRAMUSCULAR; INTRAVENOUS PRN
Status: DISCONTINUED | OUTPATIENT
Start: 2024-05-23 | End: 2024-05-23 | Stop reason: SDUPTHER

## 2024-05-23 RX ORDER — SIMETHICONE 80 MG
80 TABLET,CHEWABLE ORAL EVERY 6 HOURS PRN
Status: DISCONTINUED | OUTPATIENT
Start: 2024-05-23 | End: 2024-05-25 | Stop reason: HOSPADM

## 2024-05-23 RX ORDER — MEPERIDINE HYDROCHLORIDE 50 MG/ML
12.5 INJECTION INTRAMUSCULAR; INTRAVENOUS; SUBCUTANEOUS EVERY 5 MIN PRN
Status: DISCONTINUED | OUTPATIENT
Start: 2024-05-23 | End: 2024-05-23 | Stop reason: HOSPADM

## 2024-05-23 RX ORDER — SODIUM CHLORIDE, SODIUM LACTATE, POTASSIUM CHLORIDE, CALCIUM CHLORIDE 600; 310; 30; 20 MG/100ML; MG/100ML; MG/100ML; MG/100ML
INJECTION, SOLUTION INTRAVENOUS CONTINUOUS
Status: DISCONTINUED | OUTPATIENT
Start: 2024-05-23 | End: 2024-05-23

## 2024-05-23 RX ORDER — SODIUM CHLORIDE 0.9 % (FLUSH) 0.9 %
5-40 SYRINGE (ML) INJECTION EVERY 12 HOURS SCHEDULED
Status: DISCONTINUED | OUTPATIENT
Start: 2024-05-23 | End: 2024-05-23 | Stop reason: HOSPADM

## 2024-05-23 RX ORDER — ONDANSETRON 2 MG/ML
4 INJECTION INTRAMUSCULAR; INTRAVENOUS EVERY 6 HOURS PRN
Status: DISCONTINUED | OUTPATIENT
Start: 2024-05-23 | End: 2024-05-25 | Stop reason: HOSPADM

## 2024-05-23 RX ORDER — ONDANSETRON 2 MG/ML
4 INJECTION INTRAMUSCULAR; INTRAVENOUS
Status: DISCONTINUED | OUTPATIENT
Start: 2024-05-23 | End: 2024-05-23 | Stop reason: HOSPADM

## 2024-05-23 RX ORDER — SODIUM CHLORIDE 0.9 % (FLUSH) 0.9 %
5-40 SYRINGE (ML) INJECTION PRN
Status: DISCONTINUED | OUTPATIENT
Start: 2024-05-23 | End: 2024-05-25 | Stop reason: HOSPADM

## 2024-05-23 RX ORDER — ENOXAPARIN SODIUM 100 MG/ML
40 INJECTION SUBCUTANEOUS EVERY 12 HOURS
Status: DISCONTINUED | OUTPATIENT
Start: 2024-05-23 | End: 2024-05-25 | Stop reason: HOSPADM

## 2024-05-23 RX ORDER — DIPHENHYDRAMINE HYDROCHLORIDE 50 MG/ML
INJECTION INTRAMUSCULAR; INTRAVENOUS PRN
Status: DISCONTINUED | OUTPATIENT
Start: 2024-05-23 | End: 2024-05-23 | Stop reason: SDUPTHER

## 2024-05-23 RX ORDER — MIDAZOLAM HYDROCHLORIDE 1 MG/ML
INJECTION INTRAMUSCULAR; INTRAVENOUS PRN
Status: DISCONTINUED | OUTPATIENT
Start: 2024-05-23 | End: 2024-05-23 | Stop reason: SDUPTHER

## 2024-05-23 RX ORDER — BUPIVACAINE HYDROCHLORIDE 2.5 MG/ML
INJECTION, SOLUTION EPIDURAL; INFILTRATION; INTRACAUDAL PRN
Status: DISCONTINUED | OUTPATIENT
Start: 2024-05-23 | End: 2024-05-23 | Stop reason: ALTCHOICE

## 2024-05-23 RX ORDER — KETOROLAC TROMETHAMINE 30 MG/ML
30 INJECTION, SOLUTION INTRAMUSCULAR; INTRAVENOUS EVERY 6 HOURS
Status: DISCONTINUED | OUTPATIENT
Start: 2024-05-23 | End: 2024-05-25 | Stop reason: HOSPADM

## 2024-05-23 RX ORDER — PROCHLORPERAZINE EDISYLATE 5 MG/ML
5 INJECTION INTRAMUSCULAR; INTRAVENOUS
Status: DISCONTINUED | OUTPATIENT
Start: 2024-05-23 | End: 2024-05-23 | Stop reason: HOSPADM

## 2024-05-23 RX ORDER — ROCURONIUM BROMIDE 10 MG/ML
INJECTION, SOLUTION INTRAVENOUS PRN
Status: DISCONTINUED | OUTPATIENT
Start: 2024-05-23 | End: 2024-05-23 | Stop reason: SDUPTHER

## 2024-05-23 RX ORDER — PROPOFOL 10 MG/ML
INJECTION, EMULSION INTRAVENOUS PRN
Status: DISCONTINUED | OUTPATIENT
Start: 2024-05-23 | End: 2024-05-23 | Stop reason: SDUPTHER

## 2024-05-23 RX ORDER — SODIUM CHLORIDE 9 MG/ML
INJECTION, SOLUTION INTRAVENOUS PRN
Status: DISCONTINUED | OUTPATIENT
Start: 2024-05-23 | End: 2024-05-25 | Stop reason: HOSPADM

## 2024-05-23 RX ORDER — METOCLOPRAMIDE HYDROCHLORIDE 5 MG/ML
INJECTION INTRAMUSCULAR; INTRAVENOUS PRN
Status: DISCONTINUED | OUTPATIENT
Start: 2024-05-23 | End: 2024-05-23 | Stop reason: SDUPTHER

## 2024-05-23 RX ORDER — OXYCODONE HYDROCHLORIDE 5 MG/1
10 TABLET ORAL PRN
Status: DISCONTINUED | OUTPATIENT
Start: 2024-05-23 | End: 2024-05-23 | Stop reason: HOSPADM

## 2024-05-23 RX ADMIN — KETOROLAC TROMETHAMINE 30 MG: 30 INJECTION, SOLUTION INTRAMUSCULAR; INTRAVENOUS at 19:52

## 2024-05-23 RX ADMIN — HYDROMORPHONE HYDROCHLORIDE 1 MG: 1 INJECTION, SOLUTION INTRAMUSCULAR; INTRAVENOUS; SUBCUTANEOUS at 07:29

## 2024-05-23 RX ADMIN — KETOROLAC TROMETHAMINE 30 MG: 30 INJECTION, SOLUTION INTRAMUSCULAR; INTRAVENOUS at 14:06

## 2024-05-23 RX ADMIN — SUGAMMADEX 500 MG: 100 INJECTION, SOLUTION INTRAVENOUS at 08:39

## 2024-05-23 RX ADMIN — METOCLOPRAMIDE 10 MG: 5 INJECTION, SOLUTION INTRAMUSCULAR; INTRAVENOUS at 08:40

## 2024-05-23 RX ADMIN — MORPHINE SULFATE 6 MG: 10 INJECTION INTRAVENOUS at 23:27

## 2024-05-23 RX ADMIN — DIPHENHYDRAMINE HYDROCHLORIDE 12.5 MG: 50 INJECTION INTRAMUSCULAR; INTRAVENOUS at 07:45

## 2024-05-23 RX ADMIN — PROPOFOL 50 MG: 10 INJECTION, EMULSION INTRAVENOUS at 08:39

## 2024-05-23 RX ADMIN — ACETAMINOPHEN 1000 MG: 500 TABLET ORAL at 06:01

## 2024-05-23 RX ADMIN — FAMOTIDINE 20 MG: 10 INJECTION, SOLUTION INTRAVENOUS at 06:01

## 2024-05-23 RX ADMIN — MIDAZOLAM 1 MG: 1 INJECTION INTRAMUSCULAR; INTRAVENOUS at 07:29

## 2024-05-23 RX ADMIN — PANTOPRAZOLE SODIUM 40 MG: 40 INJECTION, POWDER, FOR SOLUTION INTRAVENOUS at 20:02

## 2024-05-23 RX ADMIN — MORPHINE SULFATE 6 MG: 10 INJECTION INTRAVENOUS at 16:05

## 2024-05-23 RX ADMIN — SODIUM CHLORIDE, PRESERVATIVE FREE 10 ML: 5 INJECTION INTRAVENOUS at 19:56

## 2024-05-23 RX ADMIN — SIMETHICONE 80 MG: 80 TABLET, CHEWABLE ORAL at 13:07

## 2024-05-23 RX ADMIN — SODIUM CHLORIDE, POTASSIUM CHLORIDE, SODIUM LACTATE AND CALCIUM CHLORIDE: 600; 310; 30; 20 INJECTION, SOLUTION INTRAVENOUS at 18:14

## 2024-05-23 RX ADMIN — ONDANSETRON HYDROCHLORIDE 4 MG: 2 INJECTION INTRAMUSCULAR; INTRAVENOUS at 07:45

## 2024-05-23 RX ADMIN — FENTANYL CITRATE 75 MCG: 50 INJECTION, SOLUTION INTRAMUSCULAR; INTRAVENOUS at 07:31

## 2024-05-23 RX ADMIN — NYSTATIN 500000 UNITS: 100000 SUSPENSION ORAL at 06:01

## 2024-05-23 RX ADMIN — WATER 3000 MG: 1 INJECTION INTRAMUSCULAR; INTRAVENOUS; SUBCUTANEOUS at 07:45

## 2024-05-23 RX ADMIN — FENTANYL CITRATE 25 MCG: 50 INJECTION, SOLUTION INTRAMUSCULAR; INTRAVENOUS at 07:29

## 2024-05-23 RX ADMIN — MIDAZOLAM 1 MG: 1 INJECTION INTRAMUSCULAR; INTRAVENOUS at 07:31

## 2024-05-23 RX ADMIN — KETOROLAC TROMETHAMINE 30 MG: 30 INJECTION, SOLUTION INTRAMUSCULAR; INTRAVENOUS at 08:40

## 2024-05-23 RX ADMIN — SODIUM CHLORIDE, POTASSIUM CHLORIDE, SODIUM LACTATE AND CALCIUM CHLORIDE: 600; 310; 30; 20 INJECTION, SOLUTION INTRAVENOUS at 09:11

## 2024-05-23 RX ADMIN — ENOXAPARIN SODIUM 40 MG: 100 INJECTION SUBCUTANEOUS at 06:01

## 2024-05-23 RX ADMIN — LIDOCAINE HYDROCHLORIDE 100 MG: 20 INJECTION, SOLUTION INTRAVENOUS at 07:35

## 2024-05-23 RX ADMIN — ENOXAPARIN SODIUM 40 MG: 100 INJECTION SUBCUTANEOUS at 18:11

## 2024-05-23 RX ADMIN — MORPHINE SULFATE 6 MG: 10 INJECTION INTRAVENOUS at 10:40

## 2024-05-23 RX ADMIN — ROCURONIUM BROMIDE 50 MG: 10 INJECTION INTRAVENOUS at 07:35

## 2024-05-23 RX ADMIN — MORPHINE SULFATE 6 MG: 10 INJECTION INTRAVENOUS at 19:50

## 2024-05-23 RX ADMIN — SODIUM CHLORIDE, POTASSIUM CHLORIDE, SODIUM LACTATE AND CALCIUM CHLORIDE: 600; 310; 30; 20 INJECTION, SOLUTION INTRAVENOUS at 06:01

## 2024-05-23 RX ADMIN — PROPOFOL 150 MG: 10 INJECTION, EMULSION INTRAVENOUS at 07:35

## 2024-05-23 ASSESSMENT — PAIN DESCRIPTION - PAIN TYPE
TYPE: SURGICAL PAIN
TYPE: SURGICAL PAIN

## 2024-05-23 ASSESSMENT — PAIN DESCRIPTION - LOCATION
LOCATION: ABDOMEN

## 2024-05-23 ASSESSMENT — PAIN - FUNCTIONAL ASSESSMENT
PAIN_FUNCTIONAL_ASSESSMENT: 0-10
PAIN_FUNCTIONAL_ASSESSMENT: ACTIVITIES ARE NOT PREVENTED
PAIN_FUNCTIONAL_ASSESSMENT: ACTIVITIES ARE NOT PREVENTED

## 2024-05-23 ASSESSMENT — PAIN SCALES - GENERAL
PAINLEVEL_OUTOF10: 8
PAINLEVEL_OUTOF10: 4
PAINLEVEL_OUTOF10: 9
PAINLEVEL_OUTOF10: 0
PAINLEVEL_OUTOF10: 0
PAINLEVEL_OUTOF10: 8
PAINLEVEL_OUTOF10: 6
PAINLEVEL_OUTOF10: 6

## 2024-05-23 ASSESSMENT — LIFESTYLE VARIABLES: SMOKING_STATUS: 1

## 2024-05-23 ASSESSMENT — PAIN DESCRIPTION - ORIENTATION
ORIENTATION: ANTERIOR
ORIENTATION: ANTERIOR
ORIENTATION: MID
ORIENTATION: MID;ANTERIOR

## 2024-05-23 ASSESSMENT — PAIN DESCRIPTION - ONSET
ONSET: ON-GOING
ONSET: ON-GOING

## 2024-05-23 ASSESSMENT — PAIN DESCRIPTION - FREQUENCY
FREQUENCY: CONTINUOUS
FREQUENCY: CONTINUOUS

## 2024-05-23 ASSESSMENT — PAIN DESCRIPTION - DESCRIPTORS
DESCRIPTORS: ACHING;SORE;OTHER (COMMENT)
DESCRIPTORS: ACHING
DESCRIPTORS: ACHING;SORE
DESCRIPTORS: ACHING

## 2024-05-23 NOTE — PROGRESS NOTES
0935-  Pt Ox4. Pt asleep but easy to arouse. IV to the L arm infusing, dressing CDI. VSS on 2L NC. Dressings to the 5 abdominal lap sites CDI. GRAYSON drain site CDI, no drainage noted in bulb or tubing. SCDs intact to BLE. Pt denies trouble breathing/SOB at this time. Pt denies numbness/tingling at this time. Pt oriented to room, safety measures in place, call bell within reach.      0950-  Attempted to call family listed in paper chart.     1000-  Family updated of pts room number.     1045-  Pt reports nausea, too early to receive zofran per mar. Pharmacy to bring up Phenergan.    1115-  Pt states that nausea has subsided at this time. Pt resting comfortably in bed, safety measures in place, call bell within each.    1300-  Pt ambulated in hallway around nurses station w/ nursing assist. Pt returned to bed. Safety measures in place, call bell within reach.      1410-  Pt ambulated to restroom w/ nursing assist. Pt voided 200mL clear brittney urine. Pt returned to bed. Safety measures in place, call bell within reach.      1910-  Bedside and Verbal shift change report given to ISIDRO Stinson (oncoming nurse) by ISIDRO Valentine (offgoing nurse). Report included the following information Nurse Handoff Report, Adult Overview, Surgery Report, Intake/Output, MAR, and Recent Results.

## 2024-05-23 NOTE — NURSE NAVIGATOR
Patient dozing in and out of sleep throughout visit.  Patient complaining of expected pain with mild nausea.      /68   Pulse 66   Temp 97.6 °F (36.4 °C) (Oral)   Resp 17   Ht 1.74 m (5' 8.5\")   Wt 128.1 kg (282 lb 4.8 oz)   LMP 05/06/2024 (Approximate)   SpO2 100%   BMI 42.29 kg/m²     General: Alert & oriented to person, place, time, and situation  Abdomen: Appropriate tenderness, soft, non-distended  Lap sites: Within normal limits  GRAYSON Drain: No drainage within tubing nor bulb.  SCD's: In place and operating within normal limits    Plan:  -Continue medications for symptom management  -Encourage ambulation  -SCD use when in bed  -IS 10 times an hour  -NPO  -UGI in AM    Plan was discussed with the patient, as well as IS teaching provided.  Patient verbalized understanding to plan and education.  Patient completed IS x3 during this visit with no issues nor concerns noted by this RN.  She reached 1,250 mL.

## 2024-05-23 NOTE — OP NOTE
OPERATIVE REPORT         Patient:Norris Yañez   : 1980  Medical Record Number:[id number]    Pre-operative Diagnosis:  Morbid obesity (HCC) [E66.01]  BMI 40.0-44.9, adult (HCC) [Z68.41]  Post-operative Diagnosis: * No post-op diagnosis entered *  Procedure: Procedure(s):  LAPAROSCOPIC  SLEEVE GASTRECTOMY  OVER SEW GASTRIC STAPLE LINE  WEDGE LIVER  BIOPSY  INTRAOPERATIVE ENDOSCOPY WITH BIOPSY   Location: [facility]  Surgeon: Ulysses Whiting MD  Assistant:  Lane Heath PAC  - (there are no qualified interns, residents, or any other qualified house   physicians available to assist with this surgery) performed retraction of various structures,  assisted in   creation of the gastric sleeve, fired stapling devices, obtained hemostasis along staple lines via hemoclips,       Anesthesia: General       Specimens: 1. Gastric Sleeve Resection                       2. Liver Wedge Biopsy                       3. Endoscopy biopsy    EBL: less than 5cc  Additional Findings: None  Complications: None             STATEMENT OF MEDICAL NECESSITY: The patient is a 43 y.o.-year-old female who has   had a history of obesity. she has failed conservative weight loss measures,   such began to consider weight loss surgical options. she chose the   sleeve gastrectomy as a means of surgical weight control. she has undergone   nutritional and psychological teaching at this time period and does wish to proceed   with sleeve gastrectomy.     OPERATIVE PROCEDURE: The patient was brought to the operating room, placed   on the table in supine position at which time general  anesthesia was administered   without any difficulty. The abdomen was then prepped and draped in the   usual sterile fashion. Using a 15 blade, a 1 cm incision was made just to the   left of the umbilicus. The veress needle approach was used to gain access to   the peritoneal cavity which was then insufflated. The Visi-Port was then placed   at

## 2024-05-23 NOTE — ANESTHESIA PRE PROCEDURE
intravenous.  continuous noninvasive hemodynamic monitor  MIPS: Postoperative opioids intended.  Anesthetic plan and risks discussed with patient.      Plan discussed with CRNA.                    Nicolas Jin MD   5/23/2024

## 2024-05-23 NOTE — INTERVAL H&P NOTE
Update History & Physical    The patient's History and Physical of May 13, 2024 was reviewed with the patient and I examined the patient. There was no change. The surgical site was confirmed by the patient and me.     Plan: The risks, benefits, expected outcome, and alternative to the recommended procedure have been discussed with the patient. Patient understands and wants to proceed with the procedure.     Electronically signed by Ulysses Whiting MD on 5/23/2024 at 7:07 AM

## 2024-05-23 NOTE — ANESTHESIA POSTPROCEDURE EVALUATION
Department of Anesthesiology  Postprocedure Note    Patient: Norris Yañez  MRN: [id number]  YOB: 1980  Date of evaluation: 5/23/2024    Procedure Summary       Date: 05/23/24 Room / Location: [facility] MAIN 01 / [facility] MAIN OR    Anesthesia Start: 0728 Anesthesia Stop: 0857    Procedure: LAPAROSCOPIC  SLEEVE GASTRECTOMY, OVERSEWN GASTRIC STAPLE LINE, WEDGE LIVER  BIOPSY AND INTRAOPERATIVE ENDOSCOPY WITH BIOPSY 'SPEC POP\" (Abdomen) Diagnosis:       Morbid obesity (HCC)      BMI 40.0-44.9, adult (HCC)      (Morbid obesity (HCC) [E66.01])      (BMI 40.0-44.9, adult (HCC) [Z68.41])    Surgeons: Ulysses Whiting MD Responsible Provider: Nicolas Jin MD    Anesthesia Type: General ASA Status: 3            Anesthesia Type: General    Pato Phase I: Pato Score: 9    Pato Phase II:      Anesthesia Post Evaluation    Patient location during evaluation: PACU  Patient participation: complete - patient participated  Level of consciousness: awake and alert  Pain score: 2  Airway patency: patent  Nausea & Vomiting: no nausea and no vomiting  Cardiovascular status: blood pressure returned to baseline and hemodynamically stable  Respiratory status: acceptable  Hydration status: euvolemic  Multimodal analgesia pain management approach  Pain management: adequate    No notable events documented.

## 2024-05-23 NOTE — PERIOP NOTE
TRANSFER - IN REPORT:    Verbal report received from Makenna Bundy CRNA RN  on Norris Yañez  being received from OR  for routine progression of patient care      Report consisted of patient's Situation, Background, Assessment and   Recommendations(SBAR).     Information from the following report(s) Surgery Report, Intake/Output, MAR, and Recent Results was reviewed with the receiving nurse.    Opportunity for questions and clarification was provided.      Assessment completed upon patient's arrival to unit and care assumed.

## 2024-05-23 NOTE — PERIOP NOTE
Pt. Used restroom in pre-op area with assistance.   Patient placed on Anne Paws for a minimum of 30 min in  Preop.

## 2024-05-23 NOTE — PROGRESS NOTES
TRANSFER - IN REPORT:    Verbal report received from ISIDRO Pal on Norris Yañez  being received from PACU for routine post-op      Report consisted of patient's Situation, Background, Assessment and   Recommendations(SBAR).     Information from the following report(s) Nurse Handoff Report was reviewed with the receiving nurse.    Opportunity for questions and clarification was provided.      Assessment completed upon patient's arrival to unit and care assumed.

## 2024-05-24 ENCOUNTER — APPOINTMENT (OUTPATIENT)
Facility: HOSPITAL | Age: 44
End: 2024-05-24
Attending: SPECIALIST
Payer: MEDICAID

## 2024-05-24 PROCEDURE — A4216 STERILE WATER/SALINE, 10 ML: HCPCS | Performed by: SPECIALIST

## 2024-05-24 PROCEDURE — 2580000003 HC RX 258: Performed by: SPECIALIST

## 2024-05-24 PROCEDURE — 6360000002 HC RX W HCPCS: Performed by: SPECIALIST

## 2024-05-24 PROCEDURE — 74240 X-RAY XM UPR GI TRC 1CNTRST: CPT | Performed by: SPECIALIST

## 2024-05-24 PROCEDURE — C9113 INJ PANTOPRAZOLE SODIUM, VIA: HCPCS | Performed by: SPECIALIST

## 2024-05-24 PROCEDURE — 6360000004 HC RX CONTRAST MEDICATION: Performed by: SPECIALIST

## 2024-05-24 PROCEDURE — 74240 X-RAY XM UPR GI TRC 1CNTRST: CPT

## 2024-05-24 PROCEDURE — 2500000003 HC RX 250 WO HCPCS: Performed by: SPECIALIST

## 2024-05-24 PROCEDURE — 1100000000 HC RM PRIVATE

## 2024-05-24 RX ORDER — OXYCODONE HYDROCHLORIDE AND ACETAMINOPHEN 5; 325 MG/1; MG/1
1 TABLET ORAL EVERY 4 HOURS PRN
Status: DISCONTINUED | OUTPATIENT
Start: 2024-05-24 | End: 2024-05-25 | Stop reason: HOSPADM

## 2024-05-24 RX ORDER — METOCLOPRAMIDE HYDROCHLORIDE 5 MG/ML
10 INJECTION INTRAMUSCULAR; INTRAVENOUS EVERY 6 HOURS
Status: DISCONTINUED | OUTPATIENT
Start: 2024-05-24 | End: 2024-05-25 | Stop reason: HOSPADM

## 2024-05-24 RX ADMIN — SODIUM CHLORIDE, PRESERVATIVE FREE 10 ML: 5 INJECTION INTRAVENOUS at 20:10

## 2024-05-24 RX ADMIN — PANTOPRAZOLE SODIUM 40 MG: 40 INJECTION, POWDER, FOR SOLUTION INTRAVENOUS at 08:08

## 2024-05-24 RX ADMIN — DIATRIZOATE MEGLUMINE AND DIATRIZOATE SODIUM 20 ML: 660; 100 LIQUID ORAL; RECTAL at 07:32

## 2024-05-24 RX ADMIN — KETOROLAC TROMETHAMINE 30 MG: 30 INJECTION, SOLUTION INTRAMUSCULAR; INTRAVENOUS at 01:08

## 2024-05-24 RX ADMIN — ENOXAPARIN SODIUM 40 MG: 100 INJECTION SUBCUTANEOUS at 17:07

## 2024-05-24 RX ADMIN — KETOROLAC TROMETHAMINE 30 MG: 30 INJECTION, SOLUTION INTRAMUSCULAR; INTRAVENOUS at 14:35

## 2024-05-24 RX ADMIN — KETOROLAC TROMETHAMINE 30 MG: 30 INJECTION, SOLUTION INTRAMUSCULAR; INTRAVENOUS at 20:08

## 2024-05-24 RX ADMIN — ONDANSETRON 4 MG: 2 INJECTION INTRAMUSCULAR; INTRAVENOUS at 01:06

## 2024-05-24 RX ADMIN — SODIUM CHLORIDE, POTASSIUM CHLORIDE, SODIUM LACTATE AND CALCIUM CHLORIDE: 600; 310; 30; 20 INJECTION, SOLUTION INTRAVENOUS at 20:13

## 2024-05-24 RX ADMIN — ENOXAPARIN SODIUM 40 MG: 100 INJECTION SUBCUTANEOUS at 06:41

## 2024-05-24 RX ADMIN — MORPHINE SULFATE 6 MG: 10 INJECTION INTRAVENOUS at 03:51

## 2024-05-24 RX ADMIN — KETOROLAC TROMETHAMINE 30 MG: 30 INJECTION, SOLUTION INTRAMUSCULAR; INTRAVENOUS at 08:08

## 2024-05-24 RX ADMIN — METOCLOPRAMIDE 10 MG: 5 INJECTION, SOLUTION INTRAMUSCULAR; INTRAVENOUS at 22:35

## 2024-05-24 RX ADMIN — SODIUM CHLORIDE, PRESERVATIVE FREE 10 ML: 5 INJECTION INTRAVENOUS at 08:09

## 2024-05-24 RX ADMIN — SODIUM CHLORIDE, POTASSIUM CHLORIDE, SODIUM LACTATE AND CALCIUM CHLORIDE: 600; 310; 30; 20 INJECTION, SOLUTION INTRAVENOUS at 10:11

## 2024-05-24 RX ADMIN — METOCLOPRAMIDE 10 MG: 5 INJECTION, SOLUTION INTRAMUSCULAR; INTRAVENOUS at 10:11

## 2024-05-24 RX ADMIN — BARIUM SULFATE 20 ML: 960 POWDER, FOR SUSPENSION ORAL at 07:33

## 2024-05-24 RX ADMIN — METOCLOPRAMIDE 10 MG: 5 INJECTION, SOLUTION INTRAMUSCULAR; INTRAVENOUS at 17:07

## 2024-05-24 ASSESSMENT — PAIN DESCRIPTION - PAIN TYPE
TYPE: SURGICAL PAIN

## 2024-05-24 ASSESSMENT — PAIN - FUNCTIONAL ASSESSMENT
PAIN_FUNCTIONAL_ASSESSMENT: ACTIVITIES ARE NOT PREVENTED

## 2024-05-24 ASSESSMENT — PAIN SCALES - GENERAL
PAINLEVEL_OUTOF10: 7
PAINLEVEL_OUTOF10: 8

## 2024-05-24 ASSESSMENT — PAIN DESCRIPTION - DESCRIPTORS
DESCRIPTORS: ACHING
DESCRIPTORS: STABBING
DESCRIPTORS: ACHING

## 2024-05-24 ASSESSMENT — PAIN DESCRIPTION - LOCATION
LOCATION: ABDOMEN

## 2024-05-24 ASSESSMENT — PAIN DESCRIPTION - ORIENTATION
ORIENTATION: ANTERIOR
ORIENTATION: MID
ORIENTATION: MID
ORIENTATION: ANTERIOR
ORIENTATION: MID

## 2024-05-24 NOTE — PROCEDURES
[facility]    Upper GI Procedure Report      Norris Yañez    Medical Record Number:[id number]    1980    Date of Service - May 24, 2024    Pre-Op Diagnosis - patient is status post sleeve resection performed by myself 24 hours ago. They now present for UGI to assess their post surgical anatomy.    Post-Op Diagnosis -same    Procedure - UGI study with gastrografin    Surgeon - Ulysses Whiting MD    Assistant - None    Complications - None    Specimens - None    Implants - None    Estimate Blood Loss - None    Statement of Medical Necessity - Need for radiologic evaluation prior to further management of their care..    Procedure -the patient was brought to the fluoroscopy suite where they were given gastrografin.  On swallowing the contrast the patient was noted to have normal peristalsis of their esophagus with progressive flow into the distal esophagus.  Specific findings of the distal esophagus revealed that they did not have a hiatal hernia. Contrast flowed normally through the esophagus and into a properly sized sleeve stomach without reflux or obstruction or signs of stricture. The stomach filled in a timely manner and emptied into the duodenum without issue or hesitation. The anatomy was normal for the timeframe with no stricture or obstruction or any other abnormality.  Given the benign findings of today's exam we will proceed with liquid diet and start PO medications.    Ulysses Whiting MD

## 2024-05-24 NOTE — NURSE NAVIGATOR
Patient sitting on side of bed sipping protein drink and tolerating well.      Vitals:   Blood pressure 126/62, pulse 68, temperature 98.7 °F (37.1 °C), temperature source Oral, resp. rate 18, height 1.74 m (5' 8.5\"), weight 128.1 kg (282 lb 4.8 oz), last menstrual period 05/06/2024, SpO2 97 %.  Output: reviewed, and patient verified urinating clear, yellow urine.  Abdomen: Lap sites without erythema, swelling,  and/or drainage.  GRAYSON drain removed.    SCD's: Positioned and operating WNL    Patient with expected pain, but is being managed and is currently tolerable.  No nausea and/or vomiting.  Patient has been ambulating the halls.  Patient has not had the opportunity to swallow pills.    Post-op diet progression discussed with patient.  Patient to be discharged on a bariatric full liquid diet.  Patient verbalized understanding of liquid diet for next two weeks until first post-op visit.  Goal of four ounces per hour with one ounce being protein was clearly understood. Medications were discussed (i.e., pain- Percocet, Tylenol, not to use aspirin or ibuprofen based products, as well as steroids).  Constipation was discussed and ways to alleviate it were discussed.  Education completed on IS use and to ambulate every hour when at home. Patient completed a return demonstration on IS with no issues or concerns from this RN.  Lovenox and Percocet filled and in the home.  Lovenox and Percocet education provided, and patient verbalized understanding.   Patient has chewable multi-vitamin, probiotic, and Prilosec in the home; they were reviewed.  Ketosis was also reviewed.  Patient given a report card to record intake and a handout to support bedside education.  All questions were answered by this RN, and patient verbalized understanding to all education provided.  Goals for discharge were discussed, and patient verbalized understanding.  Post-op follow-up appt. marlin scheduled.

## 2024-05-24 NOTE — PROGRESS NOTES
Pt transferred to unit [address] room 319 and BSSR given to Mariely PERKINS RN at this time. Pt oriented to unit and room, settled into room and sitting on bedside, eating dinner at this time. All belongings left within reach, call light left within reach, bed lowered to floor, pt does not appear to be in any apparent distress.

## 2024-05-24 NOTE — PROGRESS NOTES
1745  Received report from ISIDRO Shaw and assumed care of patient at this time. Patient AAOx4. PIV is C/D/I with fluids infusing, no s/s of infiltration or phlebitis. VSS on RA. Breath sounds clear bilaterally. Patient reaching 1000 on IS. Steri-strip dressing to 5 abdominal lap sites are C/D/I. Patient not passing flatus. SCDs and TEDs intact to BLE. No other concerns at this time. Possessions within reach, will continue to monitor.      1910  Change of shift report given to ISIDRO Black

## 2024-05-24 NOTE — PROGRESS NOTES
1910-Bedside report received from ISIDRO Valentine. Pt A & O X 4. Pain at 8, will medicate. Pt without any issues. Call light within reach.  1955 - Pt A & O x 4. IV to LA, intact and patent. LS clear, dim,On RA. Drsgs to abd. lap sites CDI. GRAYSON charged and patent with a light red-pink drge. Pt denies nausea. Abd. Soft, tender and ND, + BS. Pain at 8, morphine given. Pt educated on pain management, importance of ambulation and IS.  Call light within reach.    2130-Pt ambulated the [facility], baljit. Well.    0115-Pt ambulated to the BR, had some N & V after, scant amnts of brown liquid emesis. Pt said emesis had blood, no blood noted. Pt Re-assured.    0350-Pt ambulated to the BR, baljit. Well. Assisted with bath wipes after.    Pt had an uneventful shift. Uses IS every hour while awake.Pt ambulated without any issues. Pain remained well-controlled with the ordered medication.   Pt aware that she is supposed to learn how to and to self inject herself with Lovenox this AM, pt was reluctant to, said to do it myself. Only received verbal instructions.  No issues/concerns at this time. Call bell within reach

## 2024-05-24 NOTE — CARE COORDINATION
CM NW aware that pt admitted to the floor status post sleeve resection. Pt is independent at baseline. Pt has [facility] Medicaid insurance. No CM needs identified at this time. CM is available shall needs arise.

## 2024-05-24 NOTE — PROGRESS NOTES
Bariatric Surgery                POD #1    /62   Pulse 64   Temp 98.5 °F (36.9 °C)   Resp 16   Ht 1.74 m (5' 8.5\")   Wt 128.1 kg (282 lb 4.8 oz)   LMP 05/06/2024 (Approximate)   SpO2 95%   BMI 42.29 kg/m²   Patient has minimal complaints of pain, minimal nausea noted     Exam:  Appears well in no distress  Lungs- clear bilaterally  Abd - soft, incisions look good without erythema           GRAYSON with minimal serosanguinous output  Extremities- no new edema or swelling    UGI - no obstrustion or leak    Data Review:    Labs: Results:       Chemistry No results for input(s): \"GLU\", \"NA\", \"K\", \"CL\", \"CO2\", \"BUN\", \"TP\", \"GLOB\" in the last 72 hours.    Invalid input(s): \"CREA\", \"CA\", \"AGAP\", \"BUCR\", \"TBIL\", \"GPT\", \"AP\", \"ALB\", \"AGRAT\"   CBC w/Diff No results for input(s): \"WBC\", \"RBC\", \"HGB\", \"HCT\", \"PLT\", \"RETIC\" in the last 72 hours.    Invalid input(s): \"GRANS\", \"LYMPH\", \"EOS\"   Coagulation No results for input(s): \"INR\", \"APTT\" in the last 72 hours.    Invalid input(s): \"PTP\"    Liver Enzymes No results for input(s): \"TP\" in the last 72 hours.    Invalid input(s): \"ALB\", \"TBIL\", \"AP\", \"SGOT\", \"GPT\", \"DBIL\"       Assessment/Plan: S/P   laparoscopic sleeve gastrectomy - doing well without any issues    1.Start bariatric diet and protein shakes  2.D/C IV pain meds and start PO pain meds  3.D/C GRAYSON drain  4.Likley PM D/C if  Cont ok and tolerate PO

## 2024-05-24 NOTE — PROGRESS NOTES
RN called to bedside by pt to assist pt in using the bathroom. Pt safely assisted back to bed by this RN. Pt at this time says, \" I just don't feel right \". RN at this time obtains vitals, BP slightly elevated after exertion at 143/69, hr 77, temp elevated at 99.0 orally. Pt continues to report 8/10 abdominal/surgical pain, as well as \"gas\" pain. Pt does not appear to be in any distress at this time, not reporting any chest pain or sob. Pt at this time not compliant with ambulating post op, and needing continuous encouragement from RN to use incentive spirometer as instucted x10 q hour. Pt now sitting on the side of bed with continuous IV fluid hydration and attempting to tolerate liquid diet. RN notified Courtney AVILA RN on pt status. Pt projected to discharge at 1500.       1407 RN updated Henok MCLEOD on pt status and pt's concerns with discharge this day. Pt to stay additional night per MD.

## 2024-05-24 NOTE — DISCHARGE INSTRUCTIONS
[facility]  Ulysses Whiting M.D., F.A.C.S.  [address]  [address]  [place]   [address]  Office: [phone]    Fax:  [phone]    Discharge Instruction for Gastric Bypass / Sleeve Gastrectomy Patients    Diet:  Continue with the liquid diet until you are seen in the office.  Make sure you sip fluids all day. Goal for total fluid intake is at least 64 ounces per day. Aim for  Gms of protein every day.    Activity:  Walking is encouraged.  Rest when you are tired.  You may shower.  You may climb stairs.  Take your time.  No lifting more than 10-15 lbs.  If you feel discomfort during an activity, rest.  Do not drive for 1 week or until off of all narcotics.     Wound Care:  Clean incisions with soap and water when in the shower. Pat dry.  Leave steri-strips on until they fall off.  A small amount of drainage may be present from the incisions.  Contact the office if you notice an increase in drainage, an odor, increased redness, or fever > 100.5.    Medications:  You will receive a prescription for pain medication and Prilosec at the time of discharge.  For upset stomach you may take over the counter medications such as Maalox, Mylanta, or Pepto Bismol.  Gas X works very well for bloating when eating or drinking  You may take Tylenol  Non-aspirin based arthritis medications may be resumed   Take a children’s or adult chewable multivitamin.  Milk of Magnesia will help with constipation.  It is fine to take your usual home medications.  Blood pressure medications should be continued after surgery.  Diabetic medications can frequently be reduced very quickly after surgery.  Diabetics should continue to monitor blood sugars frequently after surgery and contact the prescribing physician for any questions.    Follow-Up Appointment:  If you do not already have a follow-up appointment scheduled, contact the office in the next few days to obtain one.  It is usually scheduled for

## 2024-05-24 NOTE — PROGRESS NOTES
0931  20mL serosanguinous fluid emptied from GRAYSON drain.  GRAYSON drain removed, catheter tip intact upon removal. Covered with gauze and tape.

## 2024-05-25 VITALS
TEMPERATURE: 97.5 F | WEIGHT: 282.3 LBS | OXYGEN SATURATION: 99 % | HEIGHT: 69 IN | BODY MASS INDEX: 41.81 KG/M2 | SYSTOLIC BLOOD PRESSURE: 146 MMHG | HEART RATE: 71 BPM | RESPIRATION RATE: 17 BRPM | DIASTOLIC BLOOD PRESSURE: 80 MMHG

## 2024-05-25 DIAGNOSIS — G89.18 POST-OP PAIN: Primary | ICD-10-CM

## 2024-05-25 PROCEDURE — 2580000003 HC RX 258: Performed by: SPECIALIST

## 2024-05-25 PROCEDURE — 6360000002 HC RX W HCPCS: Performed by: SPECIALIST

## 2024-05-25 PROCEDURE — C9113 INJ PANTOPRAZOLE SODIUM, VIA: HCPCS | Performed by: SPECIALIST

## 2024-05-25 PROCEDURE — A4216 STERILE WATER/SALINE, 10 ML: HCPCS | Performed by: SPECIALIST

## 2024-05-25 RX ORDER — OXYCODONE HYDROCHLORIDE AND ACETAMINOPHEN 5; 325 MG/1; MG/1
1 TABLET ORAL EVERY 6 HOURS PRN
Qty: 28 TABLET | Refills: 0 | Status: SHIPPED | OUTPATIENT
Start: 2024-05-25 | End: 2024-06-01

## 2024-05-25 RX ORDER — ENOXAPARIN SODIUM 100 MG/ML
INJECTION SUBCUTANEOUS
Qty: 4 EACH | Refills: 0 | Status: SHIPPED | OUTPATIENT
Start: 2024-05-25

## 2024-05-25 RX ADMIN — ENOXAPARIN SODIUM 40 MG: 100 INJECTION SUBCUTANEOUS at 06:24

## 2024-05-25 RX ADMIN — SODIUM CHLORIDE, POTASSIUM CHLORIDE, SODIUM LACTATE AND CALCIUM CHLORIDE: 600; 310; 30; 20 INJECTION, SOLUTION INTRAVENOUS at 04:44

## 2024-05-25 RX ADMIN — SODIUM CHLORIDE, PRESERVATIVE FREE 10 ML: 5 INJECTION INTRAVENOUS at 08:20

## 2024-05-25 RX ADMIN — KETOROLAC TROMETHAMINE 30 MG: 30 INJECTION, SOLUTION INTRAMUSCULAR; INTRAVENOUS at 04:22

## 2024-05-25 RX ADMIN — KETOROLAC TROMETHAMINE 30 MG: 30 INJECTION, SOLUTION INTRAMUSCULAR; INTRAVENOUS at 08:14

## 2024-05-25 RX ADMIN — METOCLOPRAMIDE 10 MG: 5 INJECTION, SOLUTION INTRAMUSCULAR; INTRAVENOUS at 04:22

## 2024-05-25 RX ADMIN — METOCLOPRAMIDE 10 MG: 5 INJECTION, SOLUTION INTRAMUSCULAR; INTRAVENOUS at 10:51

## 2024-05-25 RX ADMIN — PANTOPRAZOLE SODIUM 40 MG: 40 INJECTION, POWDER, FOR SOLUTION INTRAVENOUS at 08:13

## 2024-05-25 RX ADMIN — SODIUM CHLORIDE, POTASSIUM CHLORIDE, SODIUM LACTATE AND CALCIUM CHLORIDE: 600; 310; 30; 20 INJECTION, SOLUTION INTRAVENOUS at 11:34

## 2024-05-25 ASSESSMENT — PAIN SCALES - GENERAL: PAINLEVEL_OUTOF10: 3

## 2024-05-25 ASSESSMENT — PAIN DESCRIPTION - DESCRIPTORS: DESCRIPTORS: ACHING

## 2024-05-25 ASSESSMENT — PAIN DESCRIPTION - LOCATION: LOCATION: ABDOMEN

## 2024-05-25 NOTE — PROGRESS NOTES
Patient A&O x 4, denies pain, denies n/v, and bowel sounds active x 4 quadrants.  Patient not passing flatus, but burping. Tolerates bariatric full liquid diet.  Trochar sites x 5 with steri strips CDI.  Patient states has been ambulating in hallway, IS at 1500.  VS stable, aferile.    0545 - Patient ambulating hallways.    0730 -Bedside and Verbal shift change report given to JASMINA Lucero RN(oncoming nurse) by ISIDRO Mccarthy (offgoing nurse). Report included the following information Nurse Handoff Report, Intake/Output, and MAR.

## 2024-05-25 NOTE — PROGRESS NOTES
Bariatric Surgery                POD #2    BP (!) 146/68   Pulse 69   Temp 98.7 °F (37.1 °C) (Oral)   Resp 17   Ht 1.74 m (5' 8.5\")   Wt 128.1 kg (282 lb 4.8 oz)   LMP 05/06/2024 (Approximate)   SpO2 99%   BMI 42.29 kg/m²   Patient has minimal complaints of pain, minimal nausea noted  Ready to go now     Exam:  Appears well in no distress  Lungs- clear bilaterally  Abd - soft, incisions look good without erythema  Extremities- no new edema or swelling    UGI - no obstrustion or leak    Data Review:    Labs: Results:       Chemistry No results for input(s): \"GLU\", \"NA\", \"K\", \"CL\", \"CO2\", \"BUN\", \"TP\", \"GLOB\" in the last 72 hours.    Invalid input(s): \"CREA\", \"CA\", \"AGAP\", \"BUCR\", \"TBIL\", \"GPT\", \"AP\", \"ALB\", \"AGRAT\"   CBC w/Diff No results for input(s): \"WBC\", \"RBC\", \"HGB\", \"HCT\", \"PLT\", \"RETIC\" in the last 72 hours.    Invalid input(s): \"GRANS\", \"LYMPH\", \"EOS\"   Coagulation No results for input(s): \"INR\", \"APTT\" in the last 72 hours.    Invalid input(s): \"PTP\"    Liver Enzymes No results for input(s): \"TP\" in the last 72 hours.    Invalid input(s): \"ALB\", \"TBIL\", \"AP\", \"SGOT\", \"GPT\", \"DBIL\"       Assessment/Plan: S/P   laparoscopic sleeve gastrectomy - doing well without any issues    1.Cont bariatric diet and protein shakes  2. D/C home

## 2024-05-25 NOTE — PROGRESS NOTES
Patient received discharge instructions all questions were answered. No signs of distress at this time. Id bracelets removed.

## 2024-05-25 NOTE — PROGRESS NOTES
Care manager notified of need to set up Medicaid cab transport - trip ID is #[id number]; called unit to request that patient be taken to ED lobby so that she is ready for transport.

## 2024-05-28 ENCOUNTER — TELEPHONE (OUTPATIENT)
Age: 44
End: 2024-05-28

## 2024-05-28 NOTE — TELEPHONE ENCOUNTER
Called pt and she is at work and would like a call back.    This LPN spoke with patient post-operatively.      Hydration: Patient has hydrated with 57 ounces as yesterday. Thus far today, she has hydrated with 36 ounces as of 3:45 pm.     Nausea and/or vomiting: None     Pain: Currently managed without medication.  Patient is taking Gas X for gassiness; it is effective.  Patient is using an ice pack.     Lovenox injections: Administering every 12 hours, rotating sites.  Started back on Eliquis.      Lap sites: Remove gauze and assess, No erythema, drainage, and/or swelling     GRAYSON drain site: No drainage; dressing being changed daily     BM: Daily     Ambulation: Patient is walking throughout house every hour.     IS: Patient continues to use 10x's per hour while awake.  Patient has reached 2,200 mL.     Temperature: 97.4 degrees     Medications: (confirmed currently taking)              *Multi-vitamin: yes              *Probiotic: no              *Prilosec: yes   Questions: None  This LPN reminded the patient to contact the office with any questions and/or concerns.  Patient verbalized understanding.  Patient's two week post-op visit is scheduled and was confirmed.

## 2024-05-28 NOTE — DISCHARGE SUMMARY
Discharge Summary    Patient: Norris Yañez               Sex: female          DOA: 5/23/2024         YOB: 1980      Age:  43 y.o.        LOS:  LOS: 2 days                Admit Date: 5/23/2024    Discharge Date: 5/25/2024    Admission Diagnoses: Morbid obesity (HCC) [E66.01]  BMI 40.0-44.9, adult (HCC) [Z68.41]  Morbid obesity with body mass index (BMI) of 40.0 to 49.9 (HCC) [E66.01]    Discharge Diagnoses:      Discharge Condition: Good    Hospital Course: The patient underwent sleeve resection  on 5/23/2024. The patient tolerated the procedure well. Vital signs remained stable and the patient was transferred to  3rd floor surgical unit without complications. The patient remained stable throughout the first night post operatively with stable vital signs and adequate urine output and pain control. Pain was controlled with Dilaudid IV and IV Tylenol .  The patient on the first morning post operative was transferred to the radiology suite where they underwent a gastrograffin UGI study which showed no evidence of a leak or stricture. The drain was discontinued on POD # 1 and the patient was started on a bariatric liquid diet with protein shakes.  Despite the normal UGI and stable vital signs the decision was made to monitor her another 24 hours given her level of nausea and reluctance to try her diet.  By the morning of POD2 her nausea had resolved and The patient progressed throughout the day and was ambulating well and tolerating their diet. They were therefore discharged home with instructions to notify me with any issues that may arise.    Significant Diagnostic Studies:   No results for input(s): \"HGB\" in the last 72 hours.  No results for input(s): \"HCT\" in the last 72 hours.       Medication List        START taking these medications      enoxaparin 40 MG/0.4ML  Commonly known as: LOVENOX  40 mg every 12 hours            CONTINUE taking these medications      clotrimazole 1 % cream  Commonly

## 2024-06-06 ENCOUNTER — OFFICE VISIT (OUTPATIENT)
Age: 44
End: 2024-06-06

## 2024-06-06 ENCOUNTER — HOSPITAL ENCOUNTER (OUTPATIENT)
Facility: HOSPITAL | Age: 44
Discharge: HOME | End: 2024-06-09

## 2024-06-06 VITALS
WEIGHT: 268.1 LBS | BODY MASS INDEX: 39.71 KG/M2 | DIASTOLIC BLOOD PRESSURE: 71 MMHG | TEMPERATURE: 97.8 F | SYSTOLIC BLOOD PRESSURE: 118 MMHG | OXYGEN SATURATION: 100 % | HEART RATE: 88 BPM | HEIGHT: 69 IN

## 2024-06-06 DIAGNOSIS — Z09 POSTOPERATIVE EXAMINATION: Primary | ICD-10-CM

## 2024-06-06 PROCEDURE — 99024 POSTOP FOLLOW-UP VISIT: CPT | Performed by: NURSE PRACTITIONER

## 2024-06-06 RX ORDER — OMEPRAZOLE 20 MG/1
20 CAPSULE, DELAYED RELEASE ORAL DAILY
Qty: 30 CAPSULE | Refills: 2 | Status: SHIPPED | OUTPATIENT
Start: 2024-06-06

## 2024-06-06 RX ORDER — URSODIOL 500 MG/1
500 TABLET, FILM COATED ORAL DAILY
Qty: 30 TABLET | Refills: 4 | Status: SHIPPED | OUTPATIENT
Start: 2024-06-06

## 2024-06-06 RX ORDER — OMEPRAZOLE 20 MG/1
20 CAPSULE, DELAYED RELEASE ORAL DAILY
COMMUNITY
End: 2024-06-06 | Stop reason: SDUPTHER

## 2024-06-06 NOTE — PROGRESS NOTES
Subjective:      Norris Yañez is a 43 y.o. female is now 2 weeks status post laparoscopic sleeve gastrectomy. Doing well overall.  She has lost a total of 18 pounds since surgery.  Body mass index is 40.17 kg/m². Currently on a liquid diet without difficulty. Taking in 64 oz fluid daily.  Sources of protein include protein shakes. No structured exercise, but increasing activity.   Bowel movements are regular. The patient is not having any pain.. The patient is  compliant with multivitamins.   Surgery related complications: NA.  Liver bx report reviewed with patient.  Stomach bx report reviewed with patient.        6/6/2024    12:59 PM 5/25/2024     3:30 PM 5/25/2024    12:30 PM 5/25/2024     8:30 AM 5/25/2024     4:30 AM 5/25/2024    12:00 AM 5/24/2024     8:08 PM   Ambulatory Bariatric Summary   Systolic 118 146 146 126 147 119 135   Diastolic 71 80 68 63 69 45 65   Pulse 88 71 69 69 81 75 69   Temp 97.8 °F (36.6 °C) 97.5 °F (36.4 °C) 98.7 °F (37.1 °C) 99.4 °F (37.4 °C) 98.9 °F (37.2 °C) 98.8 °F (37.1 °C) 99.6 °F (37.6 °C)   Respirations  17 17 17 17 17 18   Weight - Scale 268.1         Height 1.74 m (5' 8.5\")         BMI 40.3 kg/m2         Weight - Scale 121.6 kg (268 lb 1.6 oz)         BMI (Calculated) 40.3                Comorbidities:      Hypertension: N/A  Diabetes: N/A  Obstructive Sleep Apnea: N/A   Hyperlipidemia: N/A  Stress Urinary Incontinence: N/A  Gastroesophageal Reflux:N/A  Weight related arthropathy:N/A    Denies diagnosis of COVID-19 since surgery.     Patient Active Problem List   Diagnosis    Morbid obesity (HCC)    Morbid obesity with body mass index (BMI) of 40.0 to 49.9 (HCC)    Anticoagulated    DVT (deep venous thrombosis) (HCC)    Functional dyspepsia    Low TSH level        Past Medical History:   Diagnosis Date    Anticoagulated     plan for lifelong after full workup via Hematology   Dr Jignesh Cooley    DVT (deep venous thrombosis) (HCC) 2021    and 2022 - 2 seperate occurances / right

## 2024-06-06 NOTE — PROGRESS NOTES
Post-Op Nutrition Note  [facility]      Patient's Name: Norris Yañez Age: 43 y.o.   YOB: 1980 Sex: female     Reviewed soft/moist puree diet progression for post-op weeks 3-4, directing patient to pg. 44-49 of the bariatric surgery education book.  Discussed post-op diet progression to soft/moist pureed phase.  Diet is high-protein, low-fat, and low-carbohydrate.  Reviewed appropriate food choices and portion sizes, maintaining adequate hydration, and  food/fluid intake by following the 30:30 rule.  Additionally we discussed eating behavior modifications, meal adaptations (use of smaller dishes/utensils, eating slowly and chewing sufficiently for digestion, moist cooking techniques, and eating behavior modifications.  Patient was instructed to consume 3 meals and 2-3 protein supplements between meals daily.  Proper meal timing reviewed to include having first meal within 2 hours of waking and eating a meal or snack every 3-4 hrs up until 2 hrs before bedtime.  Reinforced the importance of continuing chewable multivitamin & probiotic, at least 64 oz/d sugar-free/caffeine-free/non-carbonated fluids, and  g/d protein.  Stressed the importance of aiming for at least 150 min/wk of physical activity each day, increasing intensity as tolerated, with restricted lifting, to facilitate desired weight loss.        Pt voiced understanding through repeating the information back to me.  Patient's nutrition-related questions answered.  Reminded pt of their 2 week post-op medical appointment.  Additionally, I reminded pt that I would be calling them again at 1 mo. post-op.      CAROLYN BIGGS RD

## 2024-06-19 ENCOUNTER — TELEPHONE (OUTPATIENT)
Age: 44
End: 2024-06-19

## 2024-06-19 NOTE — TELEPHONE ENCOUNTER
Phone call with pt she has not had a Bm in four days, and having # 5-6 pain and pressure above belly button when she burps, pt is taking Prilosec everyday and has taken gas x two times which did not help the pain. Per Shona email pt constipation handout, the pressure and pain most likely from constipation, if no better by Monday make an appointment and pt expresses understanding.

## 2024-06-24 ENCOUNTER — TELEPHONE (OUTPATIENT)
Age: 44
End: 2024-06-24

## 2024-06-24 NOTE — TELEPHONE ENCOUNTER
Phone call with pt and she has not had a good Bm and is still having pain when she burps, transferred to  to make an appointment. Pt expresses understanding.

## 2024-06-25 ENCOUNTER — HOSPITAL ENCOUNTER (OUTPATIENT)
Facility: HOSPITAL | Age: 44
Discharge: HOME | End: 2024-06-28

## 2024-06-25 ENCOUNTER — OFFICE VISIT (OUTPATIENT)
Age: 44
End: 2024-06-25

## 2024-06-25 VITALS
OXYGEN SATURATION: 100 % | HEART RATE: 69 BPM | DIASTOLIC BLOOD PRESSURE: 63 MMHG | BODY MASS INDEX: 37.1 KG/M2 | TEMPERATURE: 97.3 F | HEIGHT: 69 IN | WEIGHT: 250.5 LBS | SYSTOLIC BLOOD PRESSURE: 111 MMHG

## 2024-06-25 VITALS — HEIGHT: 69 IN | BODY MASS INDEX: 37.1 KG/M2 | WEIGHT: 250.5 LBS

## 2024-06-25 DIAGNOSIS — K90.9 INTESTINAL MALABSORPTION, UNSPECIFIED TYPE: Primary | ICD-10-CM

## 2024-06-25 DIAGNOSIS — Z98.84 S/P LAPAROSCOPIC SLEEVE GASTRECTOMY: ICD-10-CM

## 2024-06-25 DIAGNOSIS — R30.0 DYSURIA: ICD-10-CM

## 2024-06-25 LAB — LABCORP SPECIMEN COLLECTION: NORMAL

## 2024-06-25 PROCEDURE — 99024 POSTOP FOLLOW-UP VISIT: CPT | Performed by: NURSE PRACTITIONER

## 2024-06-25 PROCEDURE — 99001 SPECIMEN HANDLING PT-LAB: CPT

## 2024-06-25 ASSESSMENT — PATIENT HEALTH QUESTIONNAIRE - PHQ9
SUM OF ALL RESPONSES TO PHQ QUESTIONS 1-9: 0
2. FEELING DOWN, DEPRESSED OR HOPELESS: NOT AT ALL
1. LITTLE INTEREST OR PLEASURE IN DOING THINGS: NOT AT ALL
SUM OF ALL RESPONSES TO PHQ QUESTIONS 1-9: 0
SUM OF ALL RESPONSES TO PHQ9 QUESTIONS 1 & 2: 0

## 2024-06-25 NOTE — PATIENT INSTRUCTIONS
Take reflux medicine 2 times a day  Can add mylanta  Can add infant gas drops      You need to get more aggressive with your bowel regimen. Constipation is very common for several reasons including pain medications, protein shakes, decreased intake, etc.    The medications for constipation on this list are available over-the-counter at most drug or grocery stores.     GET THINGS MOVING   TAKE 1 capful or packet of Miralax (polyethylene glycol) mixed with at least 8 ounces of water or diet juice 2 times daily, AND / OR   TAKE 1 tablet of Senna-S (sennosides / docusate) 2 times daily.   Once you are regular, you may adjust as needed (for example, stop Senna-S and continue Miralax).   If you don't have a BM for a total of 3 days, move to Step 2.     2. KEEP THINGS MOVING   INCREASE Senna-S to 2 tablets 2 times daily, AND CONTINUE Miralax, taking 1 capful or packet mixed with at least 8 ounces of water or diet juice 2 times daily   Once you are regular, you may adjust as needed.   If you don't have a BM for a total of of 5 days, begin Step 3.     3. REALLY GET THINGS MOVING   ADD 1 dose (30 ml), of Milk of Magnesia (magnesium hydroxide).   If you are able to have a BM return to Step 2 until you are done using opioids or you have constipation or diarrhea. If you don't have a BM within 8 hours,     ADD 1 tablet (10 mg) of Dulcolax (bisacodyl) OR 1 rectal suppository.   If you are able to have a bowel movement return to Step 2.   If you don't have a BM,     TAKE another dose of Milk of Magnesia and 1 tablet of Dulcolax.   If you are able to have a BM return to Step 2.   If you don't have a BM or have continued symptoms, move to Step 4.     4. REALLY, REALLY GET THINGS MOVING   TAKE ½ to 1 bottle of magnesium citrate   Once you finally have a BM, return to Step 2   If you don't have a bowel movement while you are using opioids or symptoms of constipation continue, call the office.       __ can mix 4 oz diet juice (Diet

## 2024-06-25 NOTE — PROGRESS NOTES
Patient is a 43 y.o. female approximately 1 mo S/P laparoscopic sleeve gastrectomy.      Ht 1.753 m (5' 9\")   Wt 113.6 kg (250 lb 8 oz)   BMI 36.99 kg/m²     Pre-op Wt: 286 lbs  EBW: 117 lbs    Pt has lost 36 lbs since surgery on 5/23/24.  Pt has lost 30% EBW.    Fluid intake: 50-68 oz/d    Fluids being consumed include: water, protein shakes, Gatorade Zero, Minute Maid Zero Sugar    Protein shake intake:   [x] Premier (30 g/svg PRO) [] Muscle Milk Zero RTD (20 g/svg PRO)    [] obiwon Nutrition Plan (30g/svg PRO) [] Muscle Milk Genuine Zero Sugar RTD (25 g/svg PRO)    [] obiwon Core Power (26g/svg PRO) [] Premier Clear (20 g/svg PRO)   [] Pure Protein (30 g/svg PRO [] [id number] ( g/svg PRO)   [] Ensure Max (30 g/svg PRO) [] Isopure Infusions (20g/svg, mixed with  )   [] Equate High Performance/Max (30g/svg PRO) [] Isopure Zero Carb, Unflavored,  scoop(s)/d   (25g PRO/scoop, mixed with  )    [] Other:      3 protein shakes/day; 90 g/d PRO from shakes       Pt is currently on a soft food diet without difficulty.  Tolerating well.  No complaints.      Consuming the following food sources of protein:  scrambled egg, turkey sausage jonathan, Oikos Triple Zero Greek yogurt, chicken/tuna (canned) salad, canned salmon, baked chicken tenderloin, baked fish, shrimp    Reports eating 4 meals/d, portion sizes are approximately 1 oz, and meals take approximately 30 min to complete.      Patient [] has  [x] has not had any readmissions, reoperations, complications, ED visits, nor IVF at [facility] during her first post-op month.     Exercise:  walking, 30-60 min/d, 7d/wk. Also climbing stairs x15 min, 3d/wk      Pt [x]is []is not taking her Prilosec.    Supplements:  [x] Chewable MVI:  [place] Complete, 2/d  [] Probiotic daily - starting today    Reviewed the following with patient:  Advance diet to solid phase.  Emphasized importance of following guidelines as outlined in surgery handbook for a high-protein,

## 2024-06-25 NOTE — PROGRESS NOTES
Subjective:      Norris Yañez is a 43 y.o. female is now nearly 5 weeks status post laparoscopic sleeve gastrectomy. Doing well overall.  She has lost a total of 36 pounds since surgery.  Body mass index is 37.53 kg/m².      Currently on a soft food diet without difficulty, reports dysuria and constipation and burping and denies vomiting, abdominal pain, difficulty swallowing, nausea and reflux. Taking in 50-60oz water daily.        Start Miralax and has used fleets. Had small bowel movement today. Ha snot been taking probiotic.    Bowel movements are constipated. The patient is not having any pain.. The patient is  compliant with multivitamin.         6/25/2024    10:42 AM 6/6/2024    12:59 PM 5/25/2024     3:30 PM 5/25/2024    12:30 PM 5/25/2024     8:30 AM 5/25/2024     4:30 AM 5/25/2024    12:00 AM   Ambulatory Bariatric Summary   Systolic 111 118 146 146 126 147 119   Diastolic 63 71 80 68 63 69 45   Pulse 69 88 71 69 69 81 75   Temp 97.3 °F (36.3 °C) 97.8 °F (36.6 °C) 97.5 °F (36.4 °C) 98.7 °F (37.1 °C) 99.4 °F (37.4 °C) 98.9 °F (37.2 °C) 98.8 °F (37.1 °C)   Respirations   17 17 17 17 17   Weight - Scale 250.5 268.1        Height 1.74 m (5' 8.5\") 1.74 m (5' 8.5\")        BMI 37.6 kg/m2 40.3 kg/m2        Weight - Scale 113.6 kg (250 lb 8 oz) 121.6 kg (268 lb 1.6 oz)        BMI (Calculated) 37.6 40.3               Comorbidities:    Hypertension: N/A  Diabetes: N/A  Obstructive Sleep Apnea: N/A   Hyperlipidemia: N/A  Stress Urinary Incontinence: N/A  Gastroesophageal Reflux:N/A  Weight related arthropathy:N/A    Denies diagnosis of COVID-19 since surgery.     Patient Active Problem List   Diagnosis    Morbid obesity (HCC)    Morbid obesity with body mass index (BMI) of 40.0 to 49.9 ([facility])    Anticoagulated    DVT (deep venous thrombosis) (HCC)    Functional dyspepsia    Low TSH level     Past Medical History:   Diagnosis Date    Anticoagulated     plan for lifelong after full workup via Hematology   Dr Egan

## 2024-06-26 LAB
APPEARANCE UR: ABNORMAL
BILIRUB UR QL STRIP: NEGATIVE
COLOR UR: YELLOW
GLUCOSE UR QL STRIP: NEGATIVE
HGB UR QL STRIP: NEGATIVE
KETONES UR QL STRIP: ABNORMAL
LEUKOCYTE ESTERASE UR QL STRIP: ABNORMAL
NITRITE UR QL STRIP: NEGATIVE
PH UR STRIP: 6 [PH] (ref 5–7.5)
PROT UR QL STRIP: NEGATIVE
SP GR UR STRIP: 1.01 (ref 1–1.03)
UROBILINOGEN UR STRIP-MCNC: 1 MG/DL (ref 0.2–1)

## 2024-06-27 LAB
BASOPHILS # BLD AUTO: 0 X10E3/UL (ref 0–0.2)
BASOPHILS NFR BLD AUTO: 0 %
EOSINOPHIL # BLD AUTO: 0 X10E3/UL (ref 0–0.4)
EOSINOPHIL NFR BLD AUTO: 1 %
ERYTHROCYTE [DISTWIDTH] IN BLOOD BY AUTOMATED COUNT: 12.6 % (ref 11.7–15.4)
HCT VFR BLD AUTO: 38.3 % (ref 34–46.6)
HGB BLD-MCNC: 12.3 G/DL (ref 11.1–15.9)
IMM GRANULOCYTES # BLD AUTO: 0 X10E3/UL (ref 0–0.1)
IMM GRANULOCYTES NFR BLD AUTO: 0 %
LYMPHOCYTES # BLD AUTO: 2 X10E3/UL (ref 0.7–3.1)
LYMPHOCYTES NFR BLD AUTO: 41 %
MCH RBC QN AUTO: 30 PG (ref 26.6–33)
MCHC RBC AUTO-ENTMCNC: 32.1 G/DL (ref 31.5–35.7)
MCV RBC AUTO: 93 FL (ref 79–97)
MONOCYTES # BLD AUTO: 0.3 X10E3/UL (ref 0.1–0.9)
MONOCYTES NFR BLD AUTO: 6 %
MORPHOLOGY BLD-IMP: NORMAL
NEUTROPHILS # BLD AUTO: 2.5 X10E3/UL (ref 1.4–7)
NEUTROPHILS NFR BLD AUTO: 52 %
PLATELET # BLD AUTO: 285 X10E3/UL (ref 150–450)
RBC # BLD AUTO: 4.1 X10E6/UL (ref 3.77–5.28)
WBC # BLD AUTO: 4.9 X10E3/UL (ref 3.4–10.8)

## 2024-08-28 ENCOUNTER — OFFICE VISIT (OUTPATIENT)
Age: 44
End: 2024-08-28
Payer: MEDICAID

## 2024-08-28 ENCOUNTER — HOSPITAL ENCOUNTER (OUTPATIENT)
Facility: HOSPITAL | Age: 44
Discharge: HOME | End: 2024-08-31

## 2024-08-28 VITALS
DIASTOLIC BLOOD PRESSURE: 68 MMHG | OXYGEN SATURATION: 100 % | HEIGHT: 69 IN | HEART RATE: 68 BPM | SYSTOLIC BLOOD PRESSURE: 116 MMHG | TEMPERATURE: 97.3 F | BODY MASS INDEX: 35.35 KG/M2 | WEIGHT: 238.7 LBS

## 2024-08-28 DIAGNOSIS — Z98.84 S/P LAPAROSCOPIC SLEEVE GASTRECTOMY: ICD-10-CM

## 2024-08-28 DIAGNOSIS — K90.9 INTESTINAL MALABSORPTION, UNSPECIFIED TYPE: ICD-10-CM

## 2024-08-28 DIAGNOSIS — K90.9 INTESTINAL MALABSORPTION, UNSPECIFIED TYPE: Primary | ICD-10-CM

## 2024-08-28 PROBLEM — E66.01 MORBID OBESITY (HCC): Status: RESOLVED | Noted: 2024-02-09 | Resolved: 2024-08-28

## 2024-08-28 PROBLEM — E66.01 MORBID OBESITY WITH BODY MASS INDEX (BMI) OF 40.0 TO 49.9 (HCC): Status: RESOLVED | Noted: 2024-02-09 | Resolved: 2024-08-28

## 2024-08-28 LAB — LABCORP SPECIMEN COLLECTION: NORMAL

## 2024-08-28 PROCEDURE — 99001 SPECIMEN HANDLING PT-LAB: CPT

## 2024-08-28 PROCEDURE — 99214 OFFICE O/P EST MOD 30 MIN: CPT | Performed by: NURSE PRACTITIONER

## 2024-08-28 RX ORDER — LANOLIN ALCOHOL/MO/W.PET/CERES
1000 CREAM (GRAM) TOPICAL DAILY
COMMUNITY

## 2024-08-28 RX ORDER — VITAMIN B COMPLEX
1 CAPSULE ORAL DAILY
COMMUNITY

## 2024-08-28 RX ORDER — CALCIUM CARBONATE 500(1250)
500 TABLET ORAL DAILY
COMMUNITY

## 2024-08-28 NOTE — PATIENT INSTRUCTIONS
Baritastic or My Fitness Pal [place]  80-90g protein  800-1000 calories   Keep carbs below 50g       You need to get more aggressive with your bowel regimen. Constipation is very common for several reasons including pain medications, protein shakes, decreased intake, etc.    The medications for constipation on this list are available over-the-counter at most drug or grocery stores.     GET THINGS MOVING   TAKE 1 capful or packet of Miralax (polyethylene glycol) mixed with at least 8 ounces of water or diet juice 2 times daily, AND / OR   TAKE 1 tablet of Senna-S (sennosides / docusate) 2 times daily.   Once you are regular, you may adjust as needed (for example, stop Senna-S and continue Miralax).   If you don't have a BM for a total of 3 days, move to Step 2.     2. KEEP THINGS MOVING   INCREASE Senna-S to 2 tablets 2 times daily, AND CONTINUE Miralax, taking 1 capful or packet mixed with at least 8 ounces of water or diet juice 2 times daily   Once you are regular, you may adjust as needed.   If you don't have a BM for a total of of 5 days, begin Step 3.     3. REALLY GET THINGS MOVING   ADD 1 dose (30 ml), of Milk of Magnesia (magnesium hydroxide).   If you are able to have a BM return to Step 2 until you are done using opioids or you have constipation or diarrhea. If you don't have a BM within 8 hours,     ADD 1 tablet (10 mg) of Dulcolax (bisacodyl) OR 1 rectal suppository.   If you are able to have a bowel movement return to Step 2.   If you don't have a BM,     TAKE another dose of Milk of Magnesia and 1 tablet of Dulcolax.   If you are able to have a BM return to Step 2.   If you don't have a BM or have continued symptoms, move to Step 4.     4. REALLY, REALLY GET THINGS MOVING   TAKE ½ to 1 bottle of magnesium citrate   Once you finally have a BM, return to Step 2   If you don't have a bowel movement while you are using opioids or symptoms of constipation continue, call the office.       ___ can mix 4 oz diet  powder, BC powder, Motrin, Advil, Mobic, Voltaren, Excedrin, etc.)  - Steroid pills or injections  - Smoke (cigarettes or recreational drugs)  - Alcohol  Use of any of the above may cause ulcers in your stomach which may perforate causing a medical emergency and surgery. Speak to our medical staff if another medical provider requires you to take steroids or NSAIDs.          Supplement Resource Guide    Importance of Protein:   Maintains lean body mass, produces antibodies to fight off infections, heals wounds, minimizes hair loss, helps to give you energy, helps with satiety, and keeping you full between meals.    Importance of Calcium:  Needed for healthy bones and teeth, normal blood clotting, and nervous system functioning, higher risk of osteoporosis and bone disease with non-compliance.    Importance of Multivitamins:  Many functions.  Supply you with extra nutrients that you may be missing from food.  May lead to iron deficiency anemia, weakness, fatigue, and many other symptoms with non-compliance.    Importance of B Vitamins:  Important for red blood cell formation, metabolism, energy, and helps to maintain a healthy nervous system.    Protein Supplement  Find one you like now. Use immediately after surgery.   Look for:  35-50g protein each day from your protein supplement once you reach the progression diet.      0-3 g fat per serving  0-3 g sugar per serving    Protein drinks should be split in separate dosages.    Recommend: Lifelong  1 year + Calcium Supplement:     Start taking within a month after surgery.   Look for: Calcium Citrate Plus D (1500 mg per day)  Recommend: Citracal     .            Avoid chocolate chewable calcium. Can use chewable bariatric or [employer] brand or similar chewable.    The body cannot absorb more than 500-600 mg of calcium at a time.      Take for Life Multi-vitamin Supplement:      Start immediately after surgery: any complete chewable, such as: [place]s Complete  08-Sep-2017 17:21

## 2024-08-28 NOTE — PROGRESS NOTES
dizziness  Cardiac - No history or complaints of chest pain, palpitations, or shortness of breath  Pulmonary - No history or complaints of shortness of breath or productive cough  Gastrointestinal - as noted above  Genitourinary - No history or complaints of hematuria/dysuria or renal lithiasis  Musculoskeletal - No history or complaints of joint  muscular weakness  Hematologic - No history of any bleeding episodes  Neurologic - No history or complaints of  migraine headaches or neurologic symptoms        Objective:     /68 (Site: Left Upper Arm, Position: Sitting, Cuff Size: Large Adult)   Pulse 68   Temp 97.3 °F (36.3 °C)   Ht 1.753 m (5' 9\")   Wt 108.3 kg (238 lb 11.2 oz)   SpO2 100%   BMI 35.25 kg/m²     Physical Examination:     General appearance:  alert, cooperative, no distress, appears stated age   Mental status   alert, oriented to person, place, and time   Neck  supple, no significant adenopathy     Lymphatics  no palpable lymphadenopathy, no hepatosplenomegaly   Chest  clear to auscultation, no wheezes, rales or rhonchi, symmetric air entry   Heart  normal rate, regular rhythm, normal S1, S2, no murmurs, rubs, clicks or gallops    Abdomen: soft, nontender, nondistended, no masses or organomegaly   Incision:  Well healed, no hernias      Neurological  alert, oriented, normal speech, no focal findings or movement disorder noted   Musculoskeletal no joint tenderness, deformity or swelling   Extremities peripheral pulses normal, no pedal edema, no clubbing or cyanosis   Skin normal coloration and turgor, no rashes, no suspicious skin lesions noted        Reviewed labs in Care Everywhere 8/26/24:  Hgb 11.8  Hct 34.5  LFTs WNL  Ca 9.1  Ferritin 124  Iron 75  B12 489  Folate 13      Assessment and Plan:   Intestinal malabsorption  continue required Vitamins: B12, B complex, D, iron, calcium, multivitamin  S/p laparoscopic bariatric surgery,laparoscopic sleeve gastrectomy , history of morbid obesity.  Reviewed weight loss progress and is doing well. Do recommend using food tracking damien to ensure adequate protein and caloric intake. Aim for 80-90 g protein daily and 800-1000 calories. Keep daily carbs below 50g. Continue to increase exercise.  Sleep goal is 7-9 hours each night. Patient education given on the effects of sleep deprivation on weight control.  Discussed patients weight loss goals and dietary choices in relation to goals.  Reminded to measure portions, continue high protein, low carbohydrate diet.  Reminded to eat regularly, to eat slowly & not to drink with meals.    Continue cardio exercise and add resistance exercises. 60-90 minutes of aerobic activity 5 days a week and strength training 2 days each week.  Encouraged to attend support group   Required fluid intake is >64oz daily of decaffeinated sugar free beverages.     Patient to complete labs before next visit.  Lab slip given today.  I have discussed this plan with patient and they verbalized understanding    30 minutes spent with patient.    Follow up in 3 months or sooner if patient has questions, concerns or worsening of condition, if unable to reach our office, patient should report to the ED.    Ms. Yañez has a reminder for a \"due or due soon\" health maintenance. I have asked that she contact her primary care provider for a follow-up on this health maintenance.

## 2024-08-29 LAB — 25(OH)D3+25(OH)D2 SERPL-MCNC: 47.7 NG/ML (ref 30–100)

## 2024-09-03 LAB — VIT B1 BLD-SCNC: 129.4 NMOL/L (ref 66.5–200)

## 2024-12-09 ENCOUNTER — OFFICE VISIT (OUTPATIENT)
Age: 44
End: 2024-12-09
Payer: MEDICAID

## 2024-12-09 VITALS
OXYGEN SATURATION: 100 % | WEIGHT: 218.1 LBS | TEMPERATURE: 98.1 F | BODY MASS INDEX: 32.3 KG/M2 | HEART RATE: 68 BPM | HEIGHT: 69 IN | DIASTOLIC BLOOD PRESSURE: 70 MMHG | SYSTOLIC BLOOD PRESSURE: 117 MMHG

## 2024-12-09 DIAGNOSIS — Z98.84 S/P LAPAROSCOPIC SLEEVE GASTRECTOMY: ICD-10-CM

## 2024-12-09 DIAGNOSIS — K90.9 INTESTINAL MALABSORPTION, UNSPECIFIED TYPE: ICD-10-CM

## 2024-12-09 DIAGNOSIS — K90.9 INTESTINAL MALABSORPTION, UNSPECIFIED TYPE: Primary | ICD-10-CM

## 2024-12-09 PROCEDURE — 99214 OFFICE O/P EST MOD 30 MIN: CPT | Performed by: NURSE PRACTITIONER

## 2024-12-09 NOTE — PATIENT INSTRUCTIONS
Calcium citrate 500mg 3 times a day - Citracal or calcium chews    Baritastic or My Fitness Pal [place]  80-90g protein  800-1000 calories   Keep carbs below 50g     Patient Instructions      Remember hydration goals - minimum of 64 ounces of liquids per day (dehydration is the number one reason for hospital readmission).  Continue to monitor carbohydrate and protein intake you need a minimum of  Grams of protein daily- remember to keep your total carbohydrates to 50 grams or less per day for best results.  Continue to work towards exercise goals - 60-90 minutes, 5 times a week minimum of deliberate, aerobic exercise is the ultimate goal with strength training 2 times each week. Refer to [employer] for  information.  Remember to take vitamins as directed.  Attend support group the 2nd Thursday of each month.  6.  Constipation: Milk of Magnesia is for immediate relief only.  Miralax is to be used every day if constipation is a chronic problem.    7.  Diarrhea: patients will occasionally develop lactose intolerance after surgery.  Check to see if your protein shake has whey in it.  If it does try a protein powder or drink that does not have whey and stop all yogurts, cheeses and milks to see if the diarrhea goes away.    8.  If you have had labs drawn.  We will only call you if you have abnormal results.  Otherwise you can access the lab results in \"[facility]\".  You will only need the access code the first time you sign on.    9.  Call us at [phone] or email us through \"[employer]\" with questions,     concerns or worsening of condition, we have someone on call 24 hours a day.  If you are unable to reach our office, you are to go to your Primary Care Physician or the Emergency Department.     NOTE TO GASTRIC BYPASS PATIENTS:  (SAME APPLIES TO GASTRIC SLEEVE PATIENTS FOR FIRST TWO MONTHS)  Remember that for the rest of your life, you are not able to take the following:  - NSAIDs (ibuprofen, goody

## 2024-12-09 NOTE — PROGRESS NOTES
Subjective:     Norris Yañez  is a 44 y.o. female who presents for follow-up about 6.56 months following laparoscopic sleeve gastrectomy.   Surgery related complication: NA.    She has lost a total of 68 pounds since surgery.  Body mass index is 32.21 kg/m²..  Loss of EBW 49%.      The patient presents today to assess their progress toward their weight loss goal & to address any issues that may be present:   She is tolerating solids without difficulty, reports no real issues and denies vomiting, nausea, abdominal pain, difficulty swallowing, and reflux.    The patients diet choices have been reviewed today and counseling was given.      Fluid intake: good      Protein intake: 2-3 shakes + food; chicken, ground turkey, yogurt      Meals/day: 5-6    Taking vitamins as recommended.    The patient's exercise level: exercises 3-4 times a week, including walking, squats, jumping jacks    Changes in her medical history and medications have been reviewed.      Comorbidities:    Hypertension: N/A  Diabetes: N/A  Obstructive Sleep Apnea: N/A   Hyperlipidemia: N/A  Stress Urinary Incontinence: N/A  Gastroesophageal Reflux:N/A  Weight related arthropathy:N/A    Patient Active Problem List   Diagnosis    Anticoagulated    DVT (deep venous thrombosis) ([facility])    Functional dyspepsia    Low TSH level    Intestinal malabsorption    S/P laparoscopic sleeve gastrectomy     Past Medical History:   Diagnosis Date    Anticoagulated     plan for lifelong after full workup via Hematology   Dr Jignesh Cooley    DVT (deep venous thrombosis) ([facility]) 2021    and 2022 - 2 seperate occurances / right leg / suspected secondary to long term depo use    Functional dyspepsia     Low TSH level     0.201 in Nov 2023 / no medications as of Feb 2024    Morbid obesity     PCP  Zoila Fenton NP    Morbid obesity with body mass index (BMI) of 40.0 to 49.9     Smoker     4 cigarettes per day    Wears partial dentures     upper     Past Surgical History:

## 2024-12-11 ENCOUNTER — HOSPITAL ENCOUNTER (OUTPATIENT)
Facility: HOSPITAL | Age: 44
Setting detail: SPECIMEN
Discharge: HOME | End: 2024-12-14

## 2024-12-11 LAB — LABCORP SPECIMEN COLLECTION: NORMAL

## 2024-12-11 PROCEDURE — 99001 SPECIMEN HANDLING PT-LAB: CPT

## 2024-12-12 LAB
FERRITIN SERPL-MCNC: 56 NG/ML (ref 15–150)
FOLATE SERPL-MCNC: 9.4 NG/ML
IRON SERPL-MCNC: 116 UG/DL (ref 27–159)
VIT B12 SERPL-MCNC: 304 PG/ML (ref 232–1245)

## (undated) DEVICE — KIT SUTURING DEVICE M-CLOSE

## (undated) DEVICE — STAPLER SKIN LN REINF 60 MM ECHELON ENDOPATH

## (undated) DEVICE — SOLUTION IV LACTATED RINGERS INJECTION USP

## (undated) DEVICE — SUTURE PDS II SZ 0 L27IN ABSRB VLT L26MM CT-2 1/2 CIR Z334H

## (undated) DEVICE — Z DISCONTINUED USE 2218136 SUTURE ETHILON SZ 3-0 L30IN NONABSORBABLE BLK FSL L30MM 3/8 1671H

## (undated) DEVICE — Device

## (undated) DEVICE — STRIP SKIN CLSR W1XL5IN NYLON REINF CURAD STERIL

## (undated) DEVICE — RELOAD STPL H1.8-3.8MM REG THCK TISS G 6 ROW GRIPPING SURF

## (undated) DEVICE — SLEEVE TRCR L100MM DIA5MM UNIV STBL FOR BLDELSS DIL TIP

## (undated) DEVICE — STAPLER 60MM POWERED ECHELON 3000 LONG 440MM

## (undated) DEVICE — RELOAD STPL H4.1X2MM DIA60MM THCK TISS GRN 6 ROW PWR GST B

## (undated) DEVICE — ENDOSCOPY PUMP TUBING/ CAP SET: Brand: ERBE

## (undated) DEVICE — TROCAR ENDOSCP L100MM DIA12MM STBL SL BLDELSS ENDOPATH XCEL

## (undated) DEVICE — SUTURE MONOCRYL + SZ 4-0 L27IN ABSRB UD L19MM PS-2 3/8 CIR MCP426H

## (undated) DEVICE — TROCAR LAP L100MM DIA5MM BLDELSS W/ STBL SL ENDOPATH XCEL

## (undated) DEVICE — FORCEPS BX OVL CUP SERR DISP CAP L 240CM RAD JAW 4

## (undated) DEVICE — TUBING, SUCTION, 1/4" X 12', STRAIGHT: Brand: MEDLINE

## (undated) DEVICE — GLOVE ORANGE PI 7 1/2   MSG9075

## (undated) DEVICE — RELOAD STPL L60MM H1.5-3.6MM REG TISS BLU GRIPPING SURF B

## (undated) DEVICE — AIR SHEET,LAT,COMFORT GLIDE, BLEND 40X80: Brand: MEDLINE

## (undated) DEVICE — SET TBNG DISP TIP FOR AHTO

## (undated) DEVICE — ELECTRODE PT RET AD L9FT HI MOIST COND ADH HYDRGEL CORDED

## (undated) DEVICE — SUTURE ETHIBOND EXCL BR GRN TAPR PT 2-0 30 X563H X563H

## (undated) DEVICE — SHEARS LAP L45CM DIA5MM ULTRASONIC CRV TIP ADV HEMSTAS HARM

## (undated) DEVICE — VISIGI 3D®  CALIBRATION SYSTEM  SIZE 36FR STD W/ BULB: Brand: BOEHRINGER® VISIGI 3D™ SLEEVE GASTRECTOMY CALIBRATION SYSTEM, SIZE 36FR W/BULB

## (undated) DEVICE — GARMENT,MEDLINE,DVT,INT,CALF,MED, GEN2: Brand: MEDLINE

## (undated) DEVICE — TROCAR ENDOSCP BLDELSS 12X100 MM W/ HNDL STBL SL OPT TIP

## (undated) DEVICE — APPLICATOR LAP 35 CM 2 RIGID VISTASEAL

## (undated) DEVICE — BARIATRIC: Brand: MEDLINE INDUSTRIES, INC.

## (undated) DEVICE — SEALANT TISS 10 CC FOR HUM FIBRIN VISTASEAL

## (undated) DEVICE — APPLIER CLP L SHFT DIA12MM 20 ROT MULT LIGACLP

## (undated) DEVICE — SOLUTION IRRIG 500ML 0.9% SOD CHLO USP POUR PLAS BTL

## (undated) DEVICE — SOLUTION SURG PREP 26 CC PURPREP